# Patient Record
Sex: FEMALE | Race: WHITE
[De-identification: names, ages, dates, MRNs, and addresses within clinical notes are randomized per-mention and may not be internally consistent; named-entity substitution may affect disease eponyms.]

---

## 2018-02-19 ENCOUNTER — HOSPITAL ENCOUNTER (EMERGENCY)
Dept: HOSPITAL 25 - UCEAST | Age: 48
Discharge: HOME | End: 2018-02-19
Payer: MEDICARE

## 2018-02-19 VITALS — DIASTOLIC BLOOD PRESSURE: 92 MMHG | SYSTOLIC BLOOD PRESSURE: 143 MMHG

## 2018-02-19 DIAGNOSIS — Z88.8: ICD-10-CM

## 2018-02-19 DIAGNOSIS — I10: ICD-10-CM

## 2018-02-19 DIAGNOSIS — G43.909: Primary | ICD-10-CM

## 2018-02-19 PROCEDURE — 70450 CT HEAD/BRAIN W/O DYE: CPT

## 2018-02-19 PROCEDURE — 99212 OFFICE O/P EST SF 10 MIN: CPT

## 2018-02-19 PROCEDURE — 96360 HYDRATION IV INFUSION INIT: CPT

## 2018-02-19 PROCEDURE — G0463 HOSPITAL OUTPT CLINIC VISIT: HCPCS

## 2018-02-19 PROCEDURE — 96372 THER/PROPH/DIAG INJ SC/IM: CPT

## 2018-02-19 PROCEDURE — 96374 THER/PROPH/DIAG INJ IV PUSH: CPT

## 2018-02-19 NOTE — UC
Kimmy SORENSEN Gabriel, scribed for Celso Pisano MD on 02/19/18 at 1734 .





Headache HPI





- HPI Summary


HPI Summary: 





This patient is a 48 year [place] F [place] [facility] [place] 

with a chief complaint of a migraine since 0700 this morning. Recently she has 

been having more frequent and intense headaches. She has been having headaches 

every day for over a week. The patient rates the pain 10/10 in severity and 

radiating down her face into her cheeks. Symptoms alleviated by nothing. 

Patient reports vomiting, impaired speech, impaired vision, chills, photophobia

, and improper gait due to the pain. Patient denies weakness, numbness, CP, SOB

, ABD pain, and tingling. 





- History Of Current Complaint


Chief Complaint: UCHeadache


Stated Complaint: HEADACHE, AND VOMITING


Time Seen by Provider: 02/19/18 17:23


Hx Obtained From: Patient


Hx Last Menstrual Period: 1/30


Onset/Duration: Lasting Hours, Still Present


Onset Of Symptoms: Gradual


Initially Headache Was: Initial Pain Scale(0-10)= - 10


Currently Pain Is: Current Pain Scale(0-10)= - 10


Pain Intensity: 10


Pain Scale Used: 0-10 Numeric


Timing: Constant


Location of Headache: Frontal, Other: - down into face


Aggravating Factor(s): Bright Lights


Associated Signs And Symptoms: Positive: Negative - weakness, numbness, CP, SOB

, ABD pain, and tingling., Other (Noted In Comments)





- Allergies/Home Medications


Allergies/Adverse Reactions: 


 Allergies











Allergy/AdvReac Type Severity Reaction Status Date / Time


 


MS Bupropion Allergy  Rash Verified 01/08/14 11:54





[From Wellbutrin]     














PMH/Surg Hx/FS Hx/Imm Hx


Cardiovascular History: Hypertension


Neurological History: Migraine





- Surgical History


Surgical History: Yes


Surgery Procedure, Year, and Place: TUBES IN EARS (AS CHILD);.  LAPOROSCOPY;.  

HYSTERSCOPY;.  D&C;.  LASIK (CORRECTIVE EYE);





- Family History


Known Family History: Positive: Hypertension


   Negative: Diabetes, Renal Disease, Respiratory Disease, Seizure Disorder





- Social History


Occupation: Employed Part-time


Lives: With Family


Alcohol Use: Rare


Alcohol Amount: 1 glass of wine per week


Substance Use Type: None


Smoking Status (MU): Never Smoked Tobacco


Have You Smoked in the Last Year: No





Review of Systems


Constitutional: Chills


Eyes: Blurred Vision, Photophobia


Gastrointestinal: Vomiting


Neurological: Headache, Other - impaired speech, improper gait due to the pain


All Other Systems Reviewed And Are Negative: Yes





Physical Exam


Triage Information Reviewed: Yes


Vital Signs: 


 Initial Vital Signs











Temp  98.6 F   02/19/18 17:18


 


Pulse  72   02/19/18 17:18


 


Resp  14   02/19/18 17:18


 


BP  143/92   02/19/18 17:18


 


Pulse Ox  100   02/19/18 17:18











Vital Signs Reviewed: Yes





- Additional Comments





General: well-appearing, moderate pain distress


Skin: warm, color reflects adequate perfusion, dry


Head: normal


Eyes: EOMI, AUGUSTO, photophobic 


ENT: normal


Neck: supple, nontender


Respiratory: CTA, breath sounds present


Cardiovascular: RRR


Abdomen: soft, nontender


Bowel: present


Musculoskeletal: normal, strength/ROM intact


Neurological: normal, sensory/motor intact, A&O x3


Psychological: affect/mood appropriate








Diagnostics





- Radiology


  ** CT Brain 


Radiology Interpretation Completed By: Radiologist - NEGATIVE EXAMINATION. Dr. Pisano had reviewed this report.





Headache Course/Dx





- Course


Course Of Treatment: BP noted and advised to follow up with PCP. Allerigmarycarmen 

noted.  DISCUSSED WITH DR GARCIA; IF APHASIA IS NEW THEN, IMAGE BRAIN.  

DISCUSSED WITH PATIENT, SHE REPORTS THE STUTTERING WITH THESE TYPICAL MIGRAINES 

IS NEW OVER THE LAST FEW MIGRAINES.  IMPROVED IN CLINIC.  PATIENT DECLINES 

GOING TO THE EMERGENCY DEPARTMENT.  DISCUSSED RESULTS OF CT WITH PATIENT.  F/U 

PMD/NEUROLOGY.





- Differential Dx/Diagnosis


Provider Diagnoses: MIGRAINE HEADACHE.  HTN





- Physician Notifications


Discussed Patient Care With: Orlin Garcia


Time Discussed With Above Provider: 17:53


Instructed by Provider To: Other - Discussed patient care with Orlin Garcia 

and he stated that if it is a significant new aphasia you need to image but if 

shes had them before no imagining is needed.





Discharge





- Discharge Plan


Condition: Stable


Disposition: HOME


Prescriptions: 


HYDROcodone/ACETAMIN 5-325 MG* [Norco 5-325 TAB*] 1 tab PO Q4H PRN #10 tab MDD 6


 PRN Reason: Pain


Ondansetron ODT TAB* [Zofran 4 MG Odt TAB*] 4 mg PO Q6H PRN #10 tab.odt


 PRN Reason: Nausea


Patient Education Materials:  Migraine Headache (ED)


Referrals: 


Sherry TIAN,Gaby MANCILLA [Primary Care Provider] - 


Additional Instructions: 


FOLLOW UP WITH YOUR DOCTOR.


GO TO THE EMERGENCY DEPARTMENT FOR ANY WORSENING OF YOUR CONDITION OR QUESTIONS 

OR CONCERNS.


Your blood pressure was elevated during today's visit. Please follow up with 

your primary care provider in 1-2 weeks. 





The documentation as recorded by the Kimmy khan Gabriel accurately reflects 

the service I personally performed and the decisions made by me, Celso Pisano MD.

## 2018-02-19 NOTE — RAD
INDICATION: Headaches     



COMPARISON: MRI brain January 08, 2014



TECHNIQUE: Noncontrast axial source images were acquired from the skull base to the

vertex.



FINDINGS:



Ventricles/sulci: The ventricles and cisterns are normal in size and configuration for

age.



Brain parenchyma: There is no focal parenchymal finding, evidence of intracranial mass, 

or intracranial mass effect.



Intracranial hemorrhage:None.



Extra-axial spaces: There are no abnormal extra axial fluid collections or evidence of

extra-axial mass.



Calvarium: There is no calvarial fracture or other calvarial abnormality.



Scalp: There is no evidence of scalp or extracalvarial soft tissue abnormality.



Paranasal sinuses/mastoid: The paranasal sinuses and mastoid air cells are clear.



Other: None.



IMPRESSION: NEGATIVE EXAMINATION

## 2018-02-19 NOTE — XMS REPORT
Flora Figueroa

 Created on:February 15, 2018



Patient:Flora Figueroa

Sex:Female

:1970

External Reference #:[id number]





Demographics







 Address  [address]



   [place] [address]

 

 Mobile Phone  [phone]

 

 Email Address  [email or web address]

 

 Preferred Language  English

 

 Marital Status  Not  Or 

 

 Episcopal Affiliation  Unknown

 

 Race  White

 

 Ethnic Group  Not  Or 









Author







 Organization  [facility]

 

 Address  [address] [address]

 

 Phone  [phone]









Care Team Providers







 Name  Role  Phone

 

 Riaz Olson III, MD  Primary Care Physician  Unavailable









Payers







 Type  Date  Identification Numbers  Payment Provider  Subscriber

 

 Medicare Primary  Effective:  Policy Number:  Medicare  Flora Figueroa



   2013  [id number]    









 PayID: [id number]  [address]









 [place] [address]









 [facility]    Policy Number: [id number]  Medicaid  Flora Figueroa









 Group Name: 1 1  [address]

 

 PayID: [id number]  [place] [address]







Problems







 Date  Description  Provider  Status

 

 Onset: 2007  Seizure  Jose Manuel Tejada M.D.,FACP  Active

 

 Onset: 2007  Insomnia  Jose Manuel Tejada M.D.,FACP  Active

 

 Onset: 2007  Migraine with typical aura  Jose Manuel Tejada M.D.,FACP  
Active

 

 Onset: 2012  Chest pain  Tawnya Will D.O.  Active

 

 Onset: 2013  Myalgia &amp; Myositis Unspec  Riaz Olson M.D.  
Active

 

 Onset: 2013  Pure hypercholesterolemia  Riaz Olson M.D.  Active







Family History







 Date  Family Member(s)  Problem(s)  Comments

 

   General  Coronary Artery Disease (CAD)  M bro  50's MI,



       and other family



       members on maternal



       side

 

 :  (age  Father   due to MI  (+) smoker



 48 Years)      

 

   Father  Coronary Artery Disease (CAD)   50 yo MI

 

 Onset:  (age 19  Mother  Hypertension  



 Years)      

 

   Mother   due to MI  () - age



       57; no prior heart



       problems. (+)



       smoker, HTN

 

   Mother  Hypercholesterolemia  

 

   Mother  Coronary Artery Disease (CAD)   57 yo MI

 

   Paternal Grandmother  Hypertension  

 

   Maternal Grandfather   due to MI  () - age



       60's

 

   Maternal Grandmother   due to MI  () - age



       60's







Social History







 Type  Date  Description  Comments

 

 Marital Status    Single  

 

 Lives With    Daughter  

 

 Occupation    currently not working  

 

 Cigarette Use    Never Smoked Cigarettes  

 

 ETOH Use    Rarely consumes alcohol  

 

 Recreational Drug Use    Denies Drug Use  

 

 Smoking    Patient has never smoked  

 

 Daily Caffeine    Consumes on average 1 cup of  



     regular coffee per day  

 

 Enjoy Exercising    Does not enjoy exercising  

 

 Exercise Type/Frequency    Exercises regularly  occasionally walking

 

 General Hx Text      1 daughter







Allergies, Adverse Reactions, Alerts







 Date  Description  Reaction  Status  Severity  Comments

 

 2010  Wellbutrin XL  agitation, anxiety and  active    



     muscle cramps      

 

 2011  Lyrica  edema  active    

 

 2011  Gluten    active    

 

 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  

 

 2013  Neurontin  hyperactive  active    

 

 2013  Clonazepam  aggitated  active    

 

 10/17/2013  Tizanidine    active    







Medications







 Medication  Date  Status  Form  Strength  Qnty  SIG  Indications  Ordering



                 Provider

 

 Baclofen  02/15/  Active  Tablets  10mg  30tab  take   R51  Gus



   2018        s  tab every 8    Annabella, NP



             hours as    



             needed for    



             muscle    



             spasm    

 

 Naproxen  /  Active  Tablets  500mg  20tab  1 tablet  R51  Gus



   2018        s  with food    Annabella, NP



             by mouth    



             twice a day    



             as needed    

 

 Blood Pressure  /  Active  Misc    1unit  in the  401.9  Yenni



 Monitor          s  morning and    Hardeep Avila/Manual            at night    M.DSergey



 Inflate                

 

 Garlic  /  Active        daily    Unknown



   0000              

 

 Adderall XR  /  Active  Caps ER  30mg    1 by mouth    Unknown



   0000    24HR      every day    

 

 Klonopin  /  Active  Tablets  1mg    1po  a day    Unknown



   0000              

 

 Cholestyramine  /  Active  Packet  4gm    mix one    Sherry,



 Light  0000          packet in    nayeli Griffin and    MD



             take by    



             mouth twice    



             a day    

 

 Zinc  /  Active  Tablets  50mg    1 tab daily    Unknown



   0000          (otc)    

 

 B6 Natural  00/  Active  Tablets  50mg    take one    Unknown



   0000          capsule/tab    



             let daily    



             by mouth    

 

 D3-1000  0000/  Active  Capsules  5000Unit    please take    Unknown



   0000          1 tab daily    

 

 Zyflammend  /  Active        2 caps    Unknown



   0000          daily    

 

 Probiotic  /  Active  Capsules      1 by mouth    Unknown



   0000          every day    

 

                 

 

 Doxycycline  /  Hx  Capsules  100mg  42cap  si  A69.20  Gus



 Monohydrate  2018 -        s  twice a day    ROSAS Winchester



   /          x 21 days    



   2018              

 

 Cholestyramine  /  Hx  Packet  4gm  50uni  as directed    Yenni Tubbs        ts      Austin



                 MELENA



                 

 

 Propranolol HCL  /  Hx  Caps ER  80mg  30cap  1 tab po in  401.1  Yenni SANDERS   -    24HR    s  the evening    Austin,



                 MELENA



                 

 

 Lisinopril  /  Hx  Tablets  10mg  30tab  1 by mouth  401.9  Yenni gilliland  every day    Austin



                 MELENA



                 

 

 Aspir-81  /  Hx  Tablets  81mg  100ta  1 by mouth  401.9  Yenni Tubbs DR    bs  every day    Austin



                 MELENA



                 

 

 Atenolol  /  Hx  Tablets  25mg  90tab  1 by mouth  401.9  Yenni



    -        s  every day    Austin



                 MELENA



   2016              

 

 Propranolol HCL  /  Hx  Tablets  10mg    1 by mouth    Other



   2014 -          three times    Ordering



   /          a daily    Provider



                 

 

 Lamotrigine  /  Hx  Tablets  25mg  60tab  take 1 po    Gladys



    -        s  qhs x 2    Ben,



   /          weeks, then    M.D.



   2014          1 po bid    

 

 Cyclobenzaprine  10/17/  Hx  Tablets  10mg  30tab  one po tid  723.1  Beena



 HCL   -        s  prn spasm    Misbah,



   /              N.P.



                 

 

 Ibuprofen  /  Hx  Tablets  200mg  100ta  3 tablets    Beena



    -        bs  prn    Misbah,



   /              N.P.



   2013              

 

 Cyclobenzaprine  /  Hx  Tablets  5mg  20tab  1 tab po  729.1  Beena



 HCL   -        s  tid prn    Misbah,



   10/17/              N.P.



   2013              

 

 Venlafaxine HCL  /  Hx  Tablets  225mg  30tab  1 tab po qd    Beena



 ER  2013 -    ER 24HR    s      Misbah,



   /              N.P.



   2013              

 

 Tramadol HCL  /  Hx  Tablets  50mg  90tab  1 tab po q  729.1  Beena



    -        s  4-6 hours    Misbah,



   /          prn;    N.P.



                 

 

 Aleve  /  Hx  Capsules  220mg  60cap  prn    Other



    -        s      Ordering



   /              Provider



   2016              

 

 Adderall  /  Hx  Tablets  20mg  60tab  1 po bid    Other



    -        s      Ordering



   /              Provider



   2014              

 

 Gabapentin  02/15/  Hx  Capsules  300mg  90cap  300 mg po  729.1  Beena



    -        s  qd x1 day,    Misbah,



   /          then 300 mg    N.P.



             po bid x1    



             day, then    



             300 mg po    



             tid    

 

 Cyclobenzaprine  02/15/  Hx  Tablets  5mg  30tab  1 tab po  524.60  Beena



 HCL   -        s  qhs    Misbah,



   /              N.P.



   2013              

 

 Ergocalciferol  02/15/  Hx  Capsules  60502Rjpt  12cap  1 cap po q1  268.9  
Beena



    -        s  week for 2    Misbah,



   /          months then    N.P.



   2013          2x/mon    

 

 Butrans  /  Hx  Patches  10mcg/HR  4unit  apply 1    Riaz CHEN



    -    Weekly    s  patch    Whitney,



   /          weekly    M.DSergey



                 

 

 Prazosin HCL  /  Hx  Capsules  1mg    1 po tid    Riaz CHEN



    -              Whitney,



   /              M.D.



   2012              

 

 Ibuprofen  10/11/  Hx  Tablets  600mg  45tab  tid  382.9  Cholo



    -        s      Pachikara



   /              , MRIVER.



   2013              

 

 Amoxicillin  /  Hx  Capsules  500mg  30cap  1 tid for  462  Riaz CHEN



    -        s  10 days    Whitney,



   /              M.D.



   2011              

 

 Duragesic-25  /  Hx  Patches  25mcg/HR  10uni  apply    Jose Manuel



    -    72HR    ts  topically    HARRISON Tejada,



   10/13/          change q3    M.D.,FACP



             days    



               Use with    



             12mcg patch    

 

 Duragesic-12  /  Hx  Patches  12mcg/HR  10uni  topical q3d    Jose Manuel



    -    72HR    ts  with 25 mcg    HARRISON Tejada,



   10/11/          patch    M.D.,FACP



                 

 

 Duragesic-50  /  Hx  Patches  50mcg/HR  10uni  apply  729.1  oJse Manuel



    -    72HR    ts  topically    HARRISON Tejada,



   /          q3days    M.D.,FACP



   2010              

 

 Clonidine HCL  /  Hx  Tablets  0.1mg  60tab  1 po bid    Jose Manuel



    Arley Tejada,



   /              M.D.,FACP



   2011              

 

 Omeprazole  /  Hx  Capsules  20mg  30cap  1 po qd for  530.81  Jose Manuel gilliland  2 wks then    HARRISON Tejada,



   /          prn    M.D.,FACP



   2011              

 

 Savella Titration  /  Hx  Misc  12.5&amp;  1pak  as directed  729.1  Zohaib Chang  2010&amp;50    samples    HARRISON Tejada,



   /      mg        M.D.,FACP



   2010              

 

 Tramadol HCL  /  Hx  Tablets  50mg  100ta  PO qid prn  729.1  Jose Manuel



    Arley Tejada,



   /              M.D.,FACP



   2011              

 

 Duragesic-12  /  Hx  Patches  12mcg/HR  10uni  topical q3d    Jose Manuel



    -    72HR    ts  with 25 mcg    HARRISON Tejada,



   /          patch    M.D.,FACP



                 

 

 Adderall  /  Hx  Tablets  30mg  30tab  1 po daily    Jose Manuel



    Arley Tejada,



   /              M.D.,FACP



   2013              

 

 Vitamin D  /  Hx  Capsules  1000Unit  30cap  2 po qd  268.9  Jose Manuel



    Arley Tejada,



   /              M.D.,FACP



                 

 

 Duragesic-25  /  Hx  Patches  25mcg/HR  10uni  apply  729.1  Jose Manuel



    -    72HR    ts  topically    HARRISON Tejada,



   /          change q3    M.D.,FACP



   2010          days    

 

 Lyrica  /  Hx  Capsules  25mg  60cap  1 po bid    Jose Manuel



    Arley Tejada,



   /              M.D.,FACP



   2010              

 

 Methadone HCL  /  Hx  Tablets  5mg  120ta  1-2 po bid    Jose Manuel



    -        bs  prn pain    HARRISON Tejada,



   /              M.D.,FACP



   2010              

 

 Hydrocodone-Aceta  /  Hx  Tablets  10-325mg  40tab  1 q 4- 6  729.1  Felix delaney   -        s  hours prn    Keyla,



             pain    M.D.



                 

 

 Methadone HCL  /  Hx  Solution  5mg/5ML  600ml  5-10mg bid  729.1  Zohaib Easley



    -          prn pain    HARRISON Tejada,



                 M.D.,FACP



   2010              

 

 Savella  /  Hx  Tablets  100mg  60tab  1 bid  729.1  Jose Manuel



    -        s      HARRISON Tejada,



   .D.,FACP



   2010              

 

 Zofran  /  Hx  Tablets  4mg  60tab  1 q 6 hours  787.02  Jose Manuel



    -        s  prn nausea    HARRISON Tejada,



   .D.,FACP



   2010              

 

 Methadone HCL  /  Hx  Tablets  5mg  90tab  1 po tid    Jose Manuel Lopez -        s      HARRISON Tejada,



   .D.,FACP



                 

 

 Methadone HCL  /  Hx  Tablets  10mg  180ta  1or 2 tabs    Jose Manuel



   2009 -        bs  tid prn    HARRISON Tejada,



             pain    M.D.,FACP



   2010              

 

 Oxycodone HCL  /  Hx  Capsules  5mg  240ca  2tab q6hr  729.1  Jose Manuel



    -        ps  prn    HARRISON Tejada,



   .D.,FACP



                 

 

 Oxycontin  /  Hx  Tablets  20mg  60tab  1-2 q12h    Jose Manuel



    -    ER 12HR    darshana Tejada,



   .D.,FACP



                 

 

 Oxycodone HCL  /  Hx  Tablets  10mg  120ta  1 q 6 hours  729.1  Jose Manuel



    -        bs  prn pain    HARRISON Tejada,



                 M.D.,FACP



                 

 

 Cymbalta  /  Hx  Caps DR  30mg  60cap  po qd    Jose Manuel



    -    PA    s      HARRISON Tejada,



                 M.D.,FACP



   2010              

 

 Lamictal  /  Hx  Tablets  25mg    2 po qd    Jose Manuel Tejada,



   04/16/              M.D.,FACP



   2009              

 

 Methadone HCL  /  Hx  Tablets  10mg  120ta  1or 2 tabs  338.4  Jose Manuel



   2008 -        bs  po tid    D. Terrell,



   /              M.D.,FACP



   2009              

 

 Duragesic  10/01/  Hx  Patches  50mcg/HR  10uni  1 patch q72  729.1  Jose Manuel



   2008 -    72HR    ts      D. Terrell,



   /              M.D.,FACP



                 

 

 Duragesic  /  Hx  Patches  25mcg/HR  10uni  topical  729.1  Jose Manuel



   2008 -    72HR    ts  q3days    D. Terrell,



   /              M.D.,FACP



   2008              

 

 Ultram  /  Hx  Tablets  50mg  40tab  1 or 2 tabs    Riaz ESergey



   2008 -        s  q 6 hours    Whitney,



   /          prn pain    M.D.



   2013              

 

 Skelaxin  /  Hx  Tablets  800mg  90tab  1 q 8 hours    Jose Manuel



   2008 -        s  prn muscle    D. Terrell,



   10/22/          spasms    M.D.,FACP



                 

 

 Duragesic  07/15/  Hx  Patches  50mcg/HR  10uni  1 patch  729.1  Jose Manuel



   2008 -    72HR    ts  every 72    D. Terrell,



   /          hours    M.D.,FACP



                 

 

 Hydrocodone/Apap  /  Hx  Tablets  10-325mg  180ta  1 q 4 hours  729.1  
Jose Manuel



   2008 -        bs  prn pain    D. Terrell,



   /              M.D.,FACP



                 

 

 Cephalexin  /  Hx  Capsules  500mg  30cap  1 tid x 10  461.9  Jose Manuel



   2008 -        s  days    DSergey Tejada,



   07/15/              M.D.,FACP



   2008              

 

 Lyrica  /  Hx  Capsules  50mg  90cap  1 po tid  729.1  Jose Manuel



   2008 -        s      DSergey Tejada,



   10/22/              M.D.,FACP



   2008              

 

 Methadone  /  Hx  Tablets  10mg  12tab  1tab tid x    Jose Manuel



   2008 -        s  2days    DSergey Tejada,



   /              M.D.,FACP



   2008               1tab    



             bid x 2days    



                 



                 



             1tab qd x    



             2days    

 

 Lamictal  /  Hx  Tablets  25mg  3Mont  bid    Jose Manuel



   2007 -        h      HARRISON Tejada,



   /              M.D.,FACP



   2009              

 

 Amoxicillin  /  Hx  Capsules  500mg  40cap  2 bid for  461.9  Jose Manuel



    -        s  10 days    HARRISON Tejada,



   /              M.D.,FACP



   2008              

 

 Remeron  /  Hx  Tablets  30mg    1  Tabs PO    Jose Manuel



    -          Q hs    HARRISON Tejada,



   /              M.D.,FACP



   2008              

 

 Ambien  /  Hx  Tablets  10mg  30tab  1 po qhs    Jose Manuel



    -        s  prn sleep    HARRISON Tejada,



   /          insomnia    M.D.,FACP



                 

 

 Lamictal  /  Hx  Tablets  25mg    qd,    Jose Manuel



    -          increasing    HARRISON Tejada,



   /              M.D.,FACP



                 

 

 Ambien  /  Hx  Tablets  10mg.  10tab  1 hs prn    Jose Manuel



    -        s      HARRISON Tejada,



   /              M.D.,FACP



                 

 

 Zoloft  /  Hx  Tablets  50mg  30tab  1 PO qd    Unknown



   0000 -        s      



   2008              

 

 Lunesta  /00/  Hx            Unknown



   0000 -              



   2009              

 

 Cymbalta  /  Hx  Caps DR  60mg    1 PO qd    Unknown



   0000 -    Part          



   2009              

 

 Abilify  /  Hx  Tablets  5mg    1 po qd    Unknown



    -              



   2010              

 

 Wellbutrin XL  00/  Hx  Tablets  450  90tab  once qd  729.1  Unknown



   0000 -    ER 24HR    s      



   2010              

 

 Savella  00/  Hx  Tablets  50mg  60tab  1 po bid  729.1  Jose Manuel



    -        s      HARRISON Tejada,



   /              M.D.,FACP



                 

 

 Effexor  /00/  Hx  Tablets  37.5mg    3 po qd    Unknown



   0000 -              



   2011              

 

 Klonopin  00/  Hx  Tablets  2mg    1/2-1 tab    Unknown



   0000 -          up to qid    



   /          prn    



                 

 

 Effexor XR  /00/  Hx  Caps ER  150mg    1 tab po qd    Unknown



   0000 -    24HR      am    



   2013              

 

 Adderall  00/  Hx  Tablets  10mg    1 by mouth    Unknown



   0000 -          daily    



   2012              

 

 Valium  00/00/  Hx  Tablets  10mg  10tab  1 po qd    Unknown



   0000 -        s      



   2011              

 

 Multi Vitamin  00/00/  Hx  Liquid      1 po qd    Unknown



   0000 -              



   2012              

 

 Remeron  /00/  Hx  Tablets  15mg  90tab  1 po hs    Unknown



   0000 -        s      



   2011              

 

 Aspirin  00/00/  Hx  Tablets  81mg  50tab  1 po qd    Unknown



   0000 -    DR    s      



   2012              

 

 Doxepin HCL  /  Hx  Capsules  25mg    one po qday    Unknown



    -              



   2012              

 

 Butrans  00/  Hx  Patches  15mcg  4unit  topical    Unknown



   0000 -    Weekly    s  q7days    



   2012              

 

 Augmentin  //  Hx  Tablets  875mg  20tab  1 po bid    Unknown



   0000 -        s  for ten    



   10/13/          days    



   2011              

 

 Venlafaxine HCL  00/00/  Hx  Caps ER  150mg  30cap  1 po bid    Unknown



 ER  0000 -    24HR    s      



   2012              

 

 Tizanidine HCL  /  Hx  Tablets  4mg  60tab  take 1    Unknown



   0000 -        s  tablet po    



    and in    



             the am for    



             spasm.    

 

 Clonazepam  00/00/  Hx  Tablets  1mg  90tab  1 po q bid    Unknown



   0000 -        s      



   2013              

 

 Adderall XR  /  Hx  Caps ER  30mg  30cap  1 capsule    Unknown



   0000 -    24HR    s  po qday    



   2012              

 

 Aleve  /00/  Hx  Capsules  220mg  60cap  1 po bid    Unknown



   0000 -        s  prn    



   2012              

 

 Brainstorm  00/00/  Hx            Unknown



    -              



   2013              

 

 Pete Oil  /00/  Hx            Unknown



    -              



   2013              

 

 Elisabet Sagar Colin  00/00/  Hx            Unknown



    -              



   2013              

 

 Naprosyn  /00/  Hx  Tablets    60tab  1 po bid    Unknown



   0000 -        s      



   2013              

 

 Zanaflex  /00/  Hx  Capsules    270ca  po tid prn    Unknown



   0000 -        ps      



   2012              

 

 Aspirin DR  00/  Hx  Tablets  81mg    1 po qd    Unknown



   0000 -    DR          



   2013              

 

 Prazosin HCL  00/  Hx  Capsules  2mg    1-2 prn    Unknown



    -              



   2013              

 

 Vitamin D3 High  00/  Hx  Capsules  5000Unit    1 tabs    Unknown



 Potency  0000 -          daily    



   02/15/              



   2013              

 

 Melatonin  00/00/  Hx  Capsules  5mg  30cap  1 po qhs    Unknown



   0000 -        s      



   2013              

 

 Zyprexa  00/00/  Hx  Solution  2.5    sublingual    Unknown



   0000 -    Rec          



   2013              

 

 Temazepam  00/00/  Hx  Capsules    30cap  1 po qhs    Unknown



   0000 -        s      



   2013              

 

 Venlafaxine HCL  00/00/  Hx  Tablets  150mg  90tab  1 po qd    Unknown



 ER  0000 -    ER 24HR    s      



   2014              

 

 Clonazepam  /00/  Hx  Tablets  1mg  20tab  1-3 tablets    Unknown



   0000 -        s   po prn    



   2013              

 

 Ibuprofen  /00/  Hx  Tablets  200mg  100ta  3 tabs prn    Unknown



   0000 -        bs      



   2016              

 

 Trazodone HCL  00/  Hx  Tablets  50mg  30tab  1 tablet at    Unknown



   0000 -        s  bedtime as    



   2014              

 

 Stinging Nettle  /  Hx  Capsule      4 PO qd    Unknown



   0000 -              



   2014              

 

 Zyflammend  /  Hx        2 PO qd    Unknown



   0000 -              



   01/10/              



   2014              

 

 Tumeric  00/  Hx        4 PO qd    Unknown



   0000 -              



   2014              

 

 Omega 3  //  Hx  Capsules  1000mg  100ca  1 po qd.    Unknown



   0000 -        ps      



   2014              

 

 Magnesium  /00/  Hx        daily    Unknown



   0000 -              



   2016              

 

 Effexor XR  /  Hx  Caps ER  75mg    tapering    Unknown



   0000 -    24HR      dose    



   2016              







Immunizations







 CPT Code  Status  Date  Vaccine  Lot #

 

 82426  Given  2007  Influenza Virus 3Yrs &amp; Over  [id number]

 

 88743  Given  Unknown  Tetanus And Diptheria (Td) For Adult Use  



       Preservative Free  







Vital Signs







 Date  Vital  Result  Comment

 

 02/15/2018  Weight  150.75 lb  









 Heart Rate  77 /min  

 

 BP Systolic  126 mmHg  

 

 BP Diastolic  76 mmHg  

 

 Body Temperature  97.0 F  

 

 O2 % BldC Oximetry  98 %  









 2018  Height  67 inches  5'7"









 Weight  152.00 lb  

 

 Heart Rate  74 /min  

 

 BP Systolic  123 mmHg  

 

 BP Diastolic  82 mmHg  

 

 Body Temperature  98.1 F  

 

 O2 % BldC Oximetry  98 %  

 

 BMI (Body Mass Index)  23.8 kg/m2  









 2018  Height  66 inches  5'6"









 Weight  152.00 lb  

 

 Heart Rate  92 /min  

 

 BP Systolic Sitting  124 mmHg  

 

 BP Diastolic Sitting  78 mmHg  

 

 Respiratory Rate  14 /min  

 

 Body Temperature  98.2 F  

 

 BMI (Body Mass Index)  24.5 kg/m2  









 2018  Weight  150.25 lb  









 Heart Rate  100 /min  

 

 BP Systolic  140 mmHg  

 

 BP Diastolic  86 mmHg  

 

 Body Temperature  98.3 F  

 

 O2 % BldC Oximetry  99 %  









 2016  Weight  175.00 lb  









 Heart Rate  82 /min  

 

 BP Systolic Sitting  145 mmHg  

 

 BP Diastolic Sitting  101 mmHg  

 

 Body Temperature  98.7 F  









 2014  Height  66.5 inches  5'6.50"









 Weight  196.00 lb  

 

 Heart Rate  83 /min  

 

 BP Systolic Sitting  142 mmHg  

 

 BP Diastolic Sitting  86 mmHg  

 

 O2 % BldC Oximetry  98 %  

 

 BMI (Body Mass Index)  31.2 kg/m2  









 2014  Height  66.5 inches  5'6.50"









 Weight  193.00 lb  

 

 BP Systolic  130 mmHg  Ra large cuff

 

 BP Diastolic  86 mmHg  Ra large cuff

 

 BP Systolic Sitting  112 mmHg  LA large cuff

 

 BP Diastolic Sitting  82 mmHg  LA large cuff

 

 BP Systolic Standing  118 mmHg  LA

 

 BP Diastolic Standing  88 mmHg  LA

 

 Respiratory Rate  16 /min  

 

 BMI (Body Mass Index)  30.7 kg/m2  









 2014  Weight  193.75 lb  









 Heart Rate  86 /min  

 

 BP Systolic Sitting  141 mmHg  

 

 BP Diastolic Sitting  97 mmHg  









 01/10/2014  Heart Rate  76 /min  









 BP Systolic Sitting  130 mmHg  

 

 BP Diastolic Sitting  70 mmHg  

 

 Respiratory Rate  16 /min  









 2013  Heart Rate  80 /min  









 BP Systolic Sitting  150 mmHg  

 

 BP Diastolic Sitting  90 mmHg  

 

 Respiratory Rate  17 /min  









 10/17/2013  Weight  189.75 lb  









 Heart Rate  98 /min  

 

 BP Systolic Sitting  132 mmHg  

 

 BP Diastolic Sitting  90 mmHg  









 2013  Height  66.5 inches  5'6.50"









 Weight  193.50 lb  

 

 Heart Rate  88 /min  

 

 BP Systolic Sitting  124 mmHg  

 

 BP Diastolic Sitting  88 mmHg  

 

 BMI (Body Mass Index)  30.8 kg/m2  









 2013  Height  66.75 inches  5'6.75"









 Weight  193.50 lb  

 

 Heart Rate  96 /min  

 

 BP Systolic Sitting  136 mmHg  

 

 BP Diastolic Sitting  106 mmHg  

 

 BMI (Body Mass Index)  30.5 kg/m2  









 2013  Weight  191.00 lb  









 Heart Rate  105 /min  

 

 BP Systolic Sitting  125 mmHg  

 

 BP Diastolic Sitting  90 mmHg  









 2013  Height  66.5 inches  5'6.50"









 Weight  193.75 lb  

 

 Heart Rate  96 /min  

 

 BP Systolic Sitting  130 mmHg  

 

 BP Diastolic Sitting  90 mmHg  

 

 BMI (Body Mass Index)  30.8 kg/m2  









 02/15/2013  Height  66.5 inches  5'6.50"









 Weight  184.00 lb  

 

 Heart Rate  72 /min  

 

 BP Systolic Sitting  122 mmHg  

 

 BP Diastolic Sitting  70 mmHg  

 

 BMI (Body Mass Index)  29.3 kg/m2  









 2013  Height  66.5 inches  5'6.50"









 Weight  189.75 lb  

 

 Heart Rate  84 /min  

 

 BP Systolic Sitting  140 mmHg  

 

 BP Diastolic Sitting  88 mmHg  

 

 BMI (Body Mass Index)  30.2 kg/m2  









 2013  Height  66.5 inches  5'6.50"









 Weight  190.00 lb  

 

 Heart Rate  88 /min  

 

 BP Systolic Sitting  154 mmHg  

 

 BP Diastolic Sitting  102 mmHg  

 

 BMI (Body Mass Index)  30.2 kg/m2  









 2012  Height  66.50 inches  5'6.50"









 Weight  177.00 lb  

 

 Heart Rate  93 /min  

 

 BP Systolic Sitting  150 mmHg  

 

 BP Diastolic Sitting  100 mmHg  

 

 BMI (Body Mass Index)  28.1 kg/m2  









 2012  Height  66.50 inches  5'6.50"









 Weight  176.00 lb  

 

 Heart Rate  76 /min  

 

 BP Systolic Sitting  124 mmHg  

 

 BP Diastolic Sitting  88 mmHg  

 

 BMI (Body Mass Index)  28.0 kg/m2  









 10/11/2011  Height  66.50 inches  5'6.50"









 Weight  166.00 lb  

 

 Heart Rate  68 /min  

 

 BP Systolic Sitting  150 mmHg  

 

 BP Diastolic Sitting  90 mmHg  

 

 BMI (Body Mass Index)  26.4 kg/m2  









 2011  Heart Rate  80 /min  









 BP Systolic  124 mmHg  

 

 BP Diastolic  90 mmHg  









 2011  Weight  169.00 lb  









 Heart Rate  62 /min  

 

 BP Systolic Sitting  112 mmHg  

 

 BP Diastolic Sitting  70 mmHg  

 

 Body Temperature  101.5 F  lt ear









 2011  Height  66 inches  5'6"









 Weight  167.00 lb  

 

 Heart Rate  86 /min  

 

 BP Systolic  120 mmHg  

 

 BP Diastolic  70 mmHg  

 

 BP Systolic Sitting  122 mmHg  

 

 BP Diastolic Sitting  80 mmHg  

 

 BP Systolic Standing  120 mmHg  

 

 BP Diastolic Standing  80 mmHg  

 

 Respiratory Rate  16 /min  

 

 BMI (Body Mass Index)  27.0 kg/m2  









 2011  Height  66.25 inches  5'6.25"









 Weight  167.00 lb  

 

 Heart Rate  84 /min  

 

 BP Systolic Sitting  130 mmHg  

 

 BP Diastolic Sitting  86 mmHg  

 

 BMI (Body Mass Index)  26.7 kg/m2  









 07/15/2010  Height  66.25 inches  5'6.25"









 Weight  163.00 lb  

 

 Heart Rate  88 /min  

 

 BP Systolic Sitting  142 mmHg  

 

 BP Diastolic Sitting  94 mmHg  

 

 BMI (Body Mass Index)  26.1 kg/m2  









 2010  Height  66.25 inches  5'6.25"









 Weight  161.00 lb  

 

 Heart Rate  92 /min  

 

 BP Systolic Sitting  130 mmHg  

 

 BP Diastolic Sitting  80 mmHg  

 

 BMI (Body Mass Index)  25.8 kg/m2  









 2010  Height  66.25 inches  5'6.25"









 Weight  161.75 lb  

 

 Heart Rate  76 /min  

 

 BP Systolic  118 mmHg  

 

 BP Diastolic  92 mmHg  

 

 Respiratory Rate  16 /min  

 

 Body Temperature  98.7 F  

 

 BMI (Body Mass Index)  25.9 kg/m2  









 2010  Height  66.25 inches  5'6.25"









 Weight  156.00 lb  

 

 Heart Rate  80 /min  

 

 BP Systolic  114 mmHg  

 

 BP Diastolic  84 mmHg  

 

 BMI (Body Mass Index)  25.0 kg/m2  









 2010  Height  66.25 inches  5'6.25"









 Weight  158.00 lb  

 

 Heart Rate  60 /min  

 

 BP Systolic Sitting  120 mmHg  

 

 BP Diastolic Sitting  70 mmHg  

 

 BMI (Body Mass Index)  25.3 kg/m2  









 2010  Height  66.50 inches  5'6.50"









 Weight  156.50 lb  

 

 Heart Rate  84 /min  

 

 BP Systolic Sitting  124 mmHg  

 

 BP Diastolic Sitting  92 mmHg  

 

 BMI (Body Mass Index)  24.9 kg/m2  









 2010  Height  66.50 inches  5'6.50"









 Weight  160.50 lb  

 

 Heart Rate  96 /min  

 

 BP Systolic Sitting  130 mmHg  

 

 BP Diastolic Sitting  82 mmHg  

 

 BMI (Body Mass Index)  25.5 kg/m2  









 2009  Height  66.50 inches  5'6.50"









 Weight  161.00 lb  

 

 Heart Rate  84 /min  

 

 BP Systolic Sitting  116 mmHg  

 

 BP Diastolic Sitting  84 mmHg  

 

 BMI (Body Mass Index)  25.6 kg/m2  









 2009  Height  66.50 inches  5'6.50"









 Weight  162.00 lb  

 

 Heart Rate  84 /min  

 

 BP Systolic Sitting  134 mmHg  

 

 BP Diastolic Sitting  92 mmHg  

 

 BMI (Body Mass Index)  25.8 kg/m2  









 2009  Height  66.50 inches  5'6.50"









 Weight  161.00 lb  

 

 Heart Rate  88 /min  

 

 BP Systolic Sitting  112 mmHg  

 

 BP Diastolic Sitting  80 mmHg  

 

 BMI (Body Mass Index)  25.6 kg/m2  









 2009  Height  66.50 inches  5'6.50"









 Weight  167.00 lb  

 

 Heart Rate  76 /min  

 

 BP Systolic Sitting  122 mmHg  

 

 BP Diastolic Sitting  86 mmHg  

 

 BMI (Body Mass Index)  26.5 kg/m2  









 2009  Weight  161.00 lb  









 Heart Rate  90 /min  

 

 BP Systolic Sitting  112 mmHg  

 

 BP Diastolic Sitting  62 mmHg  









 2009  Height  67 inches  5'7"









 Weight  156.00 lb  

 

 Heart Rate  64 /min  

 

 BP Systolic Sitting  118 mmHg  

 

 BP Diastolic Sitting  76 mmHg  

 

 BMI (Body Mass Index)  24.4 kg/m2  









 2009  Weight  151.00 lb  









 Heart Rate  70 /min  

 

 BP Systolic Sitting  130 mmHg  

 

 BP Diastolic Sitting  70 mmHg  

 

 Respiratory Rate  16 /min  









 2008  Height  67 inches  5'7"









 Weight  142.00 lb  

 

 Heart Rate  86 /min  

 

 BP Systolic Sitting  124 mmHg  

 

 BP Diastolic Sitting  82 mmHg  

 

 BMI (Body Mass Index)  22.2 kg/m2  









 10/22/2008  Height  67 inches  5'7"









 Weight  140.00 lb  

 

 Heart Rate  60 /min  

 

 BP Systolic Sitting  116 mmHg  

 

 BP Diastolic Sitting  60 mmHg  

 

 BMI (Body Mass Index)  21.9 kg/m2  









 2008  Height  67 inches  5'7"









 Weight  154.00 lb  

 

 Heart Rate  68 /min  

 

 BP Systolic Sitting  124 mmHg  

 

 BP Diastolic Sitting  82 mmHg  

 

 BMI (Body Mass Index)  24.1 kg/m2  









 07/15/2008  Height  67 inches  5'7"









 Weight  155.00 lb  

 

 Heart Rate  76 /min  

 

 BP Systolic Sitting  114 mmHg  

 

 BP Diastolic Sitting  76 mmHg  

 

 BMI (Body Mass Index)  24.3 kg/m2  









 2008  Height  67 inches  5'7"









 Weight  157.00 lb  

 

 Heart Rate  62 /min  

 

 BP Systolic Sitting  116 mmHg  

 

 BP Diastolic Sitting  60 mmHg  

 

 BMI (Body Mass Index)  24.6 kg/m2  









 2008  Height  67 inches  5'7"









 Weight  163.00 lb  

 

 Heart Rate  76 /min  

 

 BP Systolic Sitting  112 mmHg  

 

 BP Diastolic Sitting  62 mmHg  

 

 BMI (Body Mass Index)  25.5 kg/m2  









 2007  Height  67 inches  5'7"









 Weight  167.00 lb  

 

 Heart Rate  80 /min  

 

 BP Systolic Sitting  112 mmHg  

 

 BP Diastolic Sitting  80 mmHg  

 

 Body Temperature  98.3 F  

 

 BMI (Body Mass Index)  26.2 kg/m2  









 2007  Height  67 inches  5'7"









 Weight  150.00 lb  

 

 Heart Rate  88 /min  

 

 BP Systolic Sitting  115 mmHg  

 

 BP Diastolic Sitting  70 mmHg  

 

 BMI (Body Mass Index)  23.5 kg/m2  







Results







 Test  Date  Test  Result  H/L  Range  Note

 

 Laboratory test finding  2016  Monospot  Negative    Negative  1

 

 CBC Auto Diff  2016  White Blood Count  4.8 10^3/uL    3.5-10.8  









 Red Blood Count  4.77 10^6/uL    4.0-5.4  

 

 Hemoglobin  13.3 g/dL    12.0-16.0  

 

 Hematocrit  40 %    35-47  

 

 Mean Corpuscular Volume  83 fL    80-97  

 

 Mean Corpuscular Hemoglobin  28 pg    27-31  

 

 Mean Corpuscular HGB Conc  34 g/dL    31-36  

 

 Red Cell Distribution Width  13 %    10.5-15  

 

 Platelet Count  292 10^3/uL    150-450  

 

 Mean Platelet Volume  9 um3    7.4-10.4  

 

 Abs Neutrophils  2.5 10^3/uL    1.5-7.7  

 

 Abs Lymphocytes  1.8 10^3/uL    1.0-4.8  

 

 Abs Monocytes  0.4 10^3/uL    0-0.8  

 

 Abs Eosinophils  0 10^3/uL    0-0.6  

 

 Abs Basophils  0 10^3/uL    0-0.2  

 

 Abs Nucleated RBC  0 10^3/uL      

 

 Granulocyte %  53.3 %    38-83  

 

 Lymphocyte %  36.8 %    25-47  

 

 Monocyte %  8.6 %    1-9  

 

 Eosinophil %  0.4 %    0-6  

 

 Basophil %  0.9 %    0-2  

 

 Nucleated Red Blood Cells %  0.1      









 Laboratory test finding  2016  Erythrocyte Sed Rate  14 mm/Hr    0-14  2









 CRP High Sensitivity  1.99 mg/L      3









 Comp Metabolic Panel  2016  Sodium  135 mmol/L    133-145  









 Potassium  4.2 mmol/L    3.5-5.0  

 

 Chloride  100 mmol/L  Low  101-111  

 

 Co2 Carbon Dioxide  28 mmol/L    22-32  

 

 Anion Gap  7 mmol/L    2-11  

 

 Glucose  91 mg/dL      

 

 Blood Urea Nitrogen  8 mg/dL    6-24  

 

 Creatinine  0.79 mg/dL    0.51-0.95  

 

 BUN/Creatinine Ratio  10.1    8-20  

 

 Calcium  9.3 mg/dL    8.6-10.3  

 

 Total Protein  7.3 g/dL    6.4-8.9  

 

 Albumin  4.5 g/dL    3.2-5.2  

 

 Globulin  2.8 g/dL    2-4  

 

 Albumin/Globulin Ratio  1.6    1-3  

 

 Total Bilirubin  0.40 mg/dL    0.2-1.0  

 

 Alkaline Phosphatase  53 U/L      

 

 Alt  12 U/L    7-52  

 

 Ast  16 U/L    13-39  

 

 Egfr Non-  78.3    &gt;60  

 

 Egfr   100.8    &gt;60  4









 Laboratory test finding  2016  TSH (Thyroid Stim Horm)  2.42 ?IU/mL    
0.34-5.60  









 Lyme Disease Serology  Negative    Negative  5









 Urinalysis Profile  2016  Urine White Blood Cell  Trace(0-5/hpf)    
Absent  









 Urine Red Blood Cell  Trace(0-2/hpf)    Absent  

 

 Urine Bacteria  Absent    Absent  

 

 Urine Squamous Epithelial Cell  Present    Absent  

 

 Urine Color  Yellow      

 

 Urine Appearance  Clear      

 

 Urine Specific Gravity  1.006  Low  1.010-1.030  

 

 Urine pH  7.0    5-9  

 

 Urine Urobilinogen  Negative    Negative  

 

 Urine Ketones  Negative    Negative  

 

 Urine Protein  Negative    Negative  

 

 Urine Leukocytes  Negative    Negative  

 

 Urine Blood  2+    Negative  

 

 Urine Nitrite  Negative    Negative  

 

 Urine Bilirubin  Negative    Negative  

 

 Urine Glucose  Negative    Negative  









 Vitamin D, 25 Hydroxy  10/17/2013  25-Hydroxy Vitamin D2  8.8 ng/mL      









 25-Hydroxy Vitamin D3  21 ng/mL      

 

 25-Hydroxy Vitamin D Total  30 ng/mL      6









 Vitamin B12 And Folate Serum  10/17/2013  Vitamin B12  527 pg/mL    180-914  









 Folate  9.1 ng/mL    2-16  









 CBC With Manual Diff  10/17/2013  White Blood Count  7.2 10^3/uL    4.8-10.8  









 Red Blood Count  4.60 10^6/uL    4.0-5.4  

 

 Hemoglobin  13.5 g/dL    12.0-16.0  

 

 Hematocrit  39 %    35-47  

 

 Mean Corpuscular Volume  85 fL    80-97  

 

 Mean Corpuscular Hemoglobin  29 pg    27-31  

 

 Mean Corpuscular HGB Conc  34 g/dL    31-36  

 

 Red Cell Distribution Width  14 %    10.5-15  

 

 Platelet Count  294 10^3/uL    150-450  

 

 Mean Platelet Volume  9 um3    7.4-10.4  

 

 Abs Neutrophils  4.7 10^3/uL    1.5-7.7  

 

 Abs Lymphocytes  2.0 10^3/uL    1.0-4.8  

 

 Abs Monocytes  0.5 10^3/uL    0-0.8  

 

 Abs Eosinophils  0 10^3/uL    0-0.6  

 

 Abs Basophils  0 10^3/uL    0-0.2  

 

 Abs Nucleated RBC  0 10^3/uL      

 

 Neutrophil %  56 %    38-83  

 

 Band %  2 %    0-8  

 

 Lymphocytes %  30 %    25-47  

 

 Monocytes %  9 %    0-13  

 

 Eosinophils %  3 %    0-6  

 

 RBC Morphology  Normal    Normal  









 Basic Metabolic Panel  10/17/2013  Sodium  134 mmol/L    133-145  









 Potassium  4.5 mmol/L    3.5-5.0  

 

 Chloride  103 mmol/L    101-111  

 

 Co2 Carbon Dioxide  26.0 mmol/L    22-32  

 

 Anion Gap  5.0 mmol/L    2-11  

 

 Glucose  108 mg/dL  High    

 

 Blood Urea Nitrogen  11 mg/dL    6-24  

 

 Creatinine  0.80 mg/dL    0.50-1.40  

 

 BUN/Creatinine Ratio  13.8    8-20  

 

 Calcium  8.9 mg/dL    8.1-9.9  

 

 Egfr Non-  78.3    &gt;60  

 

 Egfr   100.7    &gt;60  7









 Liver Function Panel  2013  Total Protein  6.4 g/dL    6.2-8.1  









 Albumin  3.8 g/dL    3.6-5.4  

 

 Globulin  2.6 g/dL    2-4  

 

 Albumin/Globulin Ratio  1.5    1-3  

 

 Total Bilirubin  0.5 mg/dL    0.4-1.5  

 

 Direct Bilirubin  0.1 mg/dL    0.1-0.5  

 

 Indirect Bilirubin  0.4 mg/dL    0.3-1.0  

 

 Alkaline Phosphatase  73 U/L      

 

 Alt  18 U/L    14-54  

 

 Ast  19 U/L    12-42  









 Laboratory test finding  2013  Egg White Allergen IgE  &lt;0.35 kU/L    
  8









 Beef Allergen IgE  &lt;0.35 kU/L      9

 

 Chicken Meat Allergen IgE  &lt;0.35 kU/L      10

 

 Chocolate Allergen IgE  &lt;0.35 kU/L      11

 

 Corn Allergen IgE  &lt;0.35 kU/L      12

 

 Cow's Milk Allergen IgE  &lt;0.35 kU/L      13

 

 Orange Allergen IgE  &lt;0.35 kU/L      14

 

 Peanut Allergen IgE  &lt;0.35 kU/L      15

 

 Rice Allergen IgE  &lt;0.35 kU/L      16

 

 Soybean Allergen IgE  &lt;0.35 kU/L      17

 

 Tomato Allergen IgE  &lt;0.35 kU/L      18

 

 Wheat Allergen IgE  &lt;0.35 kU/L      19









 Lipid Profile (Trig/Chol/HDL)  2013  Triglycerides  90 mg/dL      









 Cholesterol  180 mg/dL    Less than 200  

 

 HDL Cholesterol  52 mg/dL    40-60  20

 

 Cholesterol/HDL Ratio  3.5 Average    1-4.44  

 

 LDL Cholesterol  110.0  High  Less Than 100  21









 Laboratory test finding  2013  Glucose  99 mg/dL      22

 

 Laboratory test finding  2013  Free T4  0.76 ng/mL    0.61-1.24  









 T4  5.6 g/mL    5.0-12.0  

 

 Total T3  0.62 ng/mL    0.5-1.7  









 Laboratory test finding  2013  TSH (Thyroid  1.61 miu/mL    0.34-5.60  



     Stimulating Horm)        

 

 Vitamin D, 25 Hydroxy  2013  25-Hydroxy Vitamin D2  &lt;4.0 ng/mL      









 25-Hydroxy Vitamin D3  28 ng/mL      

 

 25-Hydroxy Vitamin D Total  28 ng/mL      23









 Laboratory test finding  2013  C Reactive Protein  0.6 mg/dL  High  Less 
than 0.5  









 Erythrocyte Sed Rate  17 mm/Hr  High  0-14  









 Comp Metabolic Panel  2013  Sodium  133 mmol/L    133-145  









 Potassium  4.5 mmol/L    3.5-5.0  

 

 Chloride  103 mmol/L    101-111  

 

 Co2 Carbon Dioxide  26.0 mmol/L    22-32  

 

 Anion Gap  4.0 mmol/L    2-11  

 

 Glucose  105 mg/dL  High    

 

 Blood Urea Nitrogen  11 mg/dL    6-24  

 

 Creatinine  0.70 mg/dL    0.50-1.40  

 

 BUN/Creatinine Ratio  15.7    8-20  

 

 Calcium  9.5 mg/dL    8.1-9.9  

 

 Total Protein  6.0 g/dL  Low  6.2-8.1  

 

 Albumin  3.8 g/dL    3.6-5.4  

 

 Globulin  2.2 g/dL    2-4  

 

 Albumin/Globulin Ratio  1.7    1-3  

 

 Total Bilirubin  0.4 mg/dL    0.4-1.5  

 

 Alkaline Phosphatase  66 U/L      

 

 Alt  16 U/L    14-54  

 

 Ast  20 U/L    12-42  

 

 Egfr Non-  91.8    &gt;60  

 

 Egfr   118.0    &gt;60  24









 CBC Auto Diff  2013  White Blood Count  5.5 10^3/uL    4.8-10.8  









 Red Blood Count  4.28 10^6/uL    4.0-5.4  

 

 Hemoglobin  12.2 g/dL    12.0-16.0  

 

 Hematocrit  35 %    35-47  

 

 Mean Corpuscular Volume  82 fL    80-97  

 

 Mean Corpuscular Hemoglobin  29 pg    27-31  

 

 Mean Corpuscular HGB Conc  35 g/dL    31-36  

 

 Red Cell Distribution Width  13 %    10.5-15  

 

 Platelet Count  259 10^3/uL    150-450  

 

 Mean Platelet Volume  9 um3    7.4-10.4  

 

 Abs Neutrophils  3.1 10^3/uL    1.5-7.7  

 

 Abs Lymphocytes  1.8 10^3/uL    1.0-4.8  

 

 Abs Monocytes  0.5 10^3/uL    0-0.8  

 

 Abs Eosinophils  0 10^3/uL    0-0.6  

 

 Abs Basophils  0 10^3/uL    0-0.2  

 

 Abs Nucleated RBC  0 10^3/uL      

 

 Granulocyte %  56.7 %    38-83  

 

 Lymphocyte %  32.7 %    25-47  

 

 Monocyte %  9.9 %  High  1-9  

 

 Eosinophil %  0.2 %    0-6  

 

 Basophil %  0.5 %    0-2  

 

 Nucleated Red Blood Cells %  0      









 Vitamin D, 25 Hydroxy  2012  25-Hydroxy Vitamin D2  &lt;4.0 ng/mL    ()  









 25-Hydroxy Vitamin D3  18 ng/mL    ()  

 

 25-Hydroxy Vitamin D Total  18 ng/mL    ()  25









 Lead  2012  Lead  &lt; 1.0 g/dL    0-25.0  26









 Lead Specimen Type  VENOUS      









 Laboratory test finding  2012  [place]erc[place],Whole Blood  &lt;1 ng/mL    0-9  
27

 

 DR Olson's Lab Panel  2011  TSH  1.64 MIU/ML    0.34-5.60  

 

 Comp Metabolic Panel  2011  Sodium  138 mmol/L    135-145  









 Potassium  4.0 mmol/L    3.5-5.0  

 

 Chloride  105 mmol/L    101-111  

 

 Co2 (Carbon Dioxide)  26.0 mmol/L    22-32  

 

 Anion Gap  7.0 mmol/L    2-11  28

 

 Glucose  73 mg/dL      

 

 BUN  10 mg/dL    6-24  

 

 Creatinine  0.7 mg/dL    0.50-1.40  

 

 One Over Creatinine  1.42      

 

 BUN/Creatinine Ratio  14.3    8-20  

 

 Calcium  9.4 mg/dL    8.1-9.9  

 

 Total Protein  6.4 GM/DL    6.2-8.1  

 

 Albumin  4.2 GM/DL    3.6-5.4  

 

 Globulin  2.2 GM/DL    2-4  

 

 Albumin/Globulin Ratio  1.9    1-3  

 

 Bilirubin Total  0.4 mg/dL    0.4-1.5  29

 

 Alkaline Phosphatase  79 U/L      

 

 Alt (SGPT)  17 U/L    14-54  

 

 Ast (Sgot)  23 U/L    12-42  

 

 eGFR Non-  92.2    &gt; 60  

 

 eGFR   118.6    &gt; 60  30









 Lipid Profile (Trig/Chol/HDL)  2011  Triglyceride  106 mg/dL      









 Cholesterol  202 mg/dL  High  Less Than 200  31

 

 High Density Lipoprotein  55 mg/dL    40-60  32

 

 Cholesterol/HDL Ratio  3.67 AVERAGE    1-4.44  

 

 Low Density Lipoprotein  126 mg/dL  High  Less Than 100  33









 Laboratory test finding  2011  Erythrocyte Sed Rate  8 MM/HR    0-15  









 C Reactive Protein  &lt; 0.5 mg/dL    Less Than 0.5  

 

 Rheumatoid Factor  &lt; 15 IU/mL    &lt;15  34

 

 Lyme Disease Serology  Negative    Negative  35









 Trish (Antinuclear Antibodies)  2011  Antinuclear AB  NEGATIVE    Negative
  

 

 CBC Auto Diff  2011  White Blood Count  6.5 CUMM    4.8-10.8  









 Red Cell Count  4.24 CUMM    4.2-5.4  

 

 Hemoglobin  12.0 g/dL    12.0-16.0  

 

 Hematocrit  35 %    35-47  

 

 Mean Corpuscular Volume  84 um3    79-97  

 

 Mean Corpuscular Hemoglob  28 pg    27-31  

 

 Mean Corpuscular HGB Cone  34 g/dL    32-36  

 

 Redcell Distribution WDTH  13 %    10.5-15  

 

 Platelet Count  290 CUMM    150-450  

 

 Mean Platelet Volume  8.4 um3    7.4-10.4  

 

 Gran %  61.8 %    38-83  

 

 Lymph %  29.2 %    25-47  

 

 Mononuclear %  8.4 %    1-9  

 

 Eosinophil %  0 %    0-6  

 

 Basophil %  0.6 %    0-2  

 

 Abs Lymphs  1.9    1.0-4.8  

 

 Abs Mononuclear  0.5    0-0.8  

 

 Absolute Neutrophil Count  4.0    1.5-7.7  

 

 Abs Eosinophils  0    0-0.6  

 

 Abs Basophils  0    0-0.2  









 Vad  10/11/2011  Vad Final  NONREACTIVE    Nonreactive  36

 

 Vitamin D, 25 Hydroxy  2010  25-Hydroxy Vitamin D2  &lt;4.0 ng/mL    ()  









 25-Hydroxy Vitamin D3  26 ng/mL    ()  

 

 25-Hydroxy Vitamin D Total  26 ng/mL    ()  37









 Laboratory test finding  2010  Vitamin D, 1,25 Dihydroxy  29 pg/mL    18-
78  38

 

 Laboratory test finding  2010  Cortisol  14.3 g/dL      39









 Dhea Sulfate  62.1 g/dL      40

 

 Dhea  2.6 ng/mL    &lt;10  41

 

 Vitamin B12  703 pg/mL    180-914  

 

 Glucose  105 mg/dL  High    42









 Vitamin D, 25 Hydroxy  2010  25-Hydroxy Vitamin D2  &lt;4.0 ng/mL    ()  









 25-Hydroxy Vitamin D3  27 ng/mL    ()  

 

 25-Hydroxy Vitamin D Total  27 ng/mL    ()  43









 Laboratory test finding  2009  Erythrocyte Sed Rate  10 MM/HR    0-15  









 Lyme Disease Serology  Negative    Negative  44

 

 Rheumatoid Factor  &lt; 20.0 IU/mL    Less Than 20  

 

 Anti-Nuclear Antibody  &lt;0.1 U    &lt;=1.0  45









 Laboratory test finding  2009  CRP High Sensitivity MML  1.4 mg/L    &lt;
=3.0  46

 

 Laboratory test finding  2009  [facility] [place] Panel  (SEE NOTE)      47









 [facility] Peanut  (SEE NOTE)      48

 

 [facility] Walnuts  (SEE NOTE)      49









 Laboratory test finding  2009  [facility] [place] Panel  (SEE NOTE)      50









 [facility] Peanut  (SEE NOTE)      51

 

 [facility] Walnuts  (SEE NOTE)      52

 

 [employer] [employer]  (SEE NOTE)      53









 Basic Metabolic Panel  2008  Sodium  136 mmol/L    135-145  









 Potassium  4.3 mmol/L    3.5-5.0  

 

 Chloride  104 mmol/L    101-111  

 

 Co2 (Carbon Dioxide)  27.0 mmol/L    22-32  

 

 Anion Gap  5.0 mmol/L    2-11  54

 

 Glucose  100 mg/dL      55

 

 BUN  15 mg/dL    6-24  

 

 Creatinine  0.66 mg/dL    0.50-1.40  

 

 One Over Creatinine  1.50      

 

 BUN/Creatinine Ratio  22.7  High  8-20  

 

 Calcium  9.0 mg/dL    8.1-9.9  56









 Laboratory test finding  2008  Estradiol  98 pg/mL      57

 

 FSH And LH  2008  FSH  1.98 MIU/ML      58









 Lutenizing Hormone  1.34 MIU/ML      59









 Laboratory test finding  2008  Rheumatoid Factor  &lt; 20.0 IU/mL    
Less Than 20  









 Lyme Disease Serology  Negative    Negative  60

 

 Erythrocyte Sed Rate  11 MM/HR    0-15  

 

 C Reactive Protein  0.6 mg/dL  High  Less Than 0.5  

 

 Anti Dna (Double Stranded Dna)  NEGATIVE    Negative  









 Protime  2008  Protime  12.3    10.9-13.3  









 Inr  1.03      61









 CBC With Manual Diff  2008  White Blood Count  8.1 CUMM    4.8-10.8  









 Red Cell Count  4.45 CUMM    4.2-5.4  

 

 Hemoglobin  13.1 g/dL    12.0-16.0  

 

 Hematocrit  37 %    35-47  

 

 Mean Corpuscular Volume  84 um3    79-97  

 

 Mean Corpuscular Hemoglob  29 pg    27-31  

 

 Mean Corpuscular HGB Cone  35 g/dL    32-36  

 

 Redcell Distribution WDTH  13 %    10.5-15  

 

 Platelet Count  248 CUMM    150-450  

 

 Mean Platelet Volume  8.4 um3    7.4-10.4  

 

 Polysegmented Neutrophil  82 %    38-83  

 

 Band Neutrophil  3 %    0-8  

 

 Lymphocyte  9 %  Low  25-47  

 

 Monocyte  4 %    0-13  

 

 Eosenophil  1 %    0-6  

 

 Atypical Lymph  1 %    0-6  

 

 Absolute Neutrophil Count  6.8      

 

 Anisocytosis  SLIGHT      









 Lipid Profile (Trig/Chol/HDL)  2008  Triglyceride  99 mg/dL      62









 Cholesterol  165 mg/dL    Less Than 200  62, 63

 

 High Density Lipoprotein  44 mg/dL    40-60  62, 64

 

 Cholesterol/HDL Ratio  3.75 AVERAGE    1-4.44  62

 

 Low Density Lipoprotein  101 mg/dL  High  Less Than 100  62, 65









 1  Would you like an EBV if Monospot is Negative?: N

 

 2  Would you like an EBV if Monospot is Negative?: N

 

 3  Low risk: &lt;1.00



   Average risk: 1.00-3.00



   High risk: &gt;3.00

 

 4  *******Because ethnic data is not always readily available,



   this report includes an eGFR for both -Americans and



   non- Americans.****



   The National Kidney Disease Education Program (NKDEP) does



   not endorse the use of the MDRD equation for patients that



   are not between the ages of 18 and 70, are pregnant, have



   extremes of body size, muscle mass, or nutritional status,



   or are non- or non-.



   According to the [employer] Kid[employer], irrespective of



   diagnosis, the stage of the disease is based on the level of



   kidney function:



   Stage Description                      GFR(mL/min/1.73 m(2))



   1     Kidney damage with normal or decreased GFR       90



   2     Kidney damage with mild decrease in GFR          60-89



   3     Moderate decrease in GFR                         30-59



   4     Severe decrease in GFR                           15-29



   5     Kidney failure                       &lt;15 (or dialysis)

 

 5  Serologic response to B. burgdorferi infection is not



   detected, but cannot rule out early infection during



   which low or undetectable antibody levels to



   B. burgdorferi may be present. If clinically indicated,



   a new serum specimen should be submitted in 7-14 days.



   Test Performed by:



   [place] [id number]



   : Celso Mix II, M.D., Ph.D.

 

 6  -- REFERENCE VALUE --



   25-HYDROXY D TOTAL (D2+D3) Optimum levels in the healthy



   population are 20-50, patients with bone disease may



   benefit from higher levels within this range.



   Test Performed by:



   [place] [id number]



   : Dilan Barrios III, M.D.

 

 7  *******Because ethnic data is not always readily available,



   this report includes an eGFR for both -Americans and



   non- Americans.****



   The National Kid[employer] Education Program (NKDEP) does



   not endorse the use of the MDRD equation for patients that



   are not between the ages of 18 and 70, are pregnant, have



   extremes of body size, muscle mass, or nutritional status,



   or are non- or non-.



   According to the [employer], irrespective of



   diagnosis, the stage of the disease is based on the level of



   kidney function:



   Stage Description                      GFR(mL/min/1.73 m(2))



   1     Kidney damage with normal or decreased GFR       90



   2     Kidney damage with mild decrease in GFR          60-89



   3     Moderate decrease in GFR                         30-59



   4     Severe decrease in GFR                           15-29



   5     Kidney failure                       &lt;15 (or dialysis)

 

 8  Class 0 (Negative &lt;0.35)



   Test Performed by:



   [place] [id number]



   : Dilan Barrios III, M.D.

 

 9  Class 0 (Negative &lt;0.35)



   Test Performed by:



   [place] [id number]



   : Dilan Barrios III, M.D.

 

 10  Class 0 (Negative &lt;0.35)



   Test Performed by:



   [place] [id number]



   : Dilan Barrios III, M.D.

 

 11  Class 0 (Negative &lt;0.35)



   Test Performed by:



   [place] [id number]



   : Dilan Barrios III, M.D.

 

 12  Class 0 (Negative &lt;0.35)



   Test Performed by:



   [place] [id number]



   : Dilan Barrios III, M.D.

 

 13  Class 0 (Negative &lt;0.35)



   Test Performed by:



   [place] [id number]



   : Dilan Barrios III, M.D.

 

 14  Class 0 (Negative &lt;0.35)



   Test Performed by:



   [place] [id number]



   : Dilan Barrios III, M.D.

 

 15  Class 0 (Negative &lt;0.35)



   Test Performed by:



   [place] [id number]



   : Dilan Barrios III, M.D.

 

 16  Class 0 (Negative &lt;0.35)



   Test Performed by:



   [place] [address]



   : Dilan Barrios III, M.D.

 

 17  Class 0 (Negative &lt;0.35)



   Test Performed by:



   [place] [address]



   : Dilan Barrios III, M.D.

 

 18  Class 0 (Negative &lt;0.35)



   Test Performed by:



   [place] [address]



   : Dilan Barrios III, M.D.

 

 19  Class 0 (Negative &lt;0.35)



   Test Performed by:



   [place] [address]



   : Dilan Barrios III, M.D.

 

 20  HDL Interpretation:



   Undesirable: High Risk:  Less than 40 mg/dL



   Desirable:  Low Risk:  Greater than 60 mg/dL

 

 21  LDL Interpretation:



   Low Risk Optimal Level:  LDL Less than 100 mg/dL



   Near or Above Optimal:  -129 mg/dL



   Borderline High Risk:  -159 mg/dL



   High Risk:  -189 mg/dL



   Very High Risk:  LDL Greater than 189 mg/dL

 

 22  FASTING

 

 23  -- REFERENCE VALUE --



   25-HYDROXY D TOTAL (D2+D3)



   Optimum levels in the normal population are 25-80



   Test Performed by:



   [facility] [place] [address]



   : Dilan Barrios III, M.D.

 

 24  *******Because ethnic data is not always readily available,



   this report includes an eGFR for both -Americans and



   non- Americans.****



   The National Kidney Disease Education Program (NKDEP) does



   not endorse the use of the MDRD equation for patients that



   are not between the ages of 18 and 70, are pregnant, have



   extremes of body size, muscle mass, or nutritional status,



   or are non- or non-.



   According to the National Kidney Foundation, irrespective of



   diagnosis, the stage of the disease is based on the level of



   kidney function:



   Stage Description                      GFR(mL/min/1.73 m(2))



   1     Kidney damage with normal or decreased GFR       90



   2     Kidney damage with mild decrease in GFR          60-89



   3     Moderate decrease in GFR                         30-59



   4     Severe decrease in GFR                           15-29



   5     Kidney failure                       &lt;15 (or dialysis)

 

 25  Interpretation: 10-24 (mild to moderate deficiency)



   



   -- REFERENCE VALUE --



   25-HYDROXY D TOTAL (D2+D3)



   Optimum levels in the normal population are 25-80



   



   Test Performed by:



   [facility]



   [address] [place] [id number]



   : Dilan Barrios III, M.D.

 

 26  CDC CLASSIFICATIONS FOR BLOOD LEAD CONCENTRATION SCREENING



   IN CHILDREN:



   CDC CLASS*           BLOOD LEAD CONCENTRATION (MCG/DL)



   I                     LESS THAN OR EQUAL TO 9



   IIA                    10 - 14



   IIB                    15 - 19



   III                    20 - 44



   IV                    45 - 69



   V                     GREATER THAN OR EQUAL TO 70



   *REFER TO CURRENT CDC GUIDELINES FOR COMMENTS AND



   INTERVENTIONS RECOMMENDED FOR EACH CLASS.



   



   *******CERTIFICATE OF BLOOD LEAD TESTING*******



   THIS IS TO CERTIFY THAT THE ABOVE NAMED PATIENT HAS



   BEEN TESTED FOR BLOOD LEAD.  TESTING WAS PERFORMED BY



   [facility] WHICH IS



   LICENSED BY [facility] TO PERFORM BLOOD LEAD



   TESTING.



   



   THIS CERTIFICATE IS PROVIDED AS A SERVICE TO OUR



   CLIENTS AND THEIR PATIENTS WHO MAY BE REQUIRED TO



   PRODUCE DOCUMENTATION OF BLOOD LEAD TESTING.



   .

 

 27  Test Performed by:



   [facility]



   [address] [place] [address]



   : Dilan Barrios III, M.D.

 

 28  Anion gap measurement may be of limited value in the



   presence of any alkalosis, especially in a combined acid



   base disorder.



   .

 

 29  A metabolite of Naproxen, O-desmethylnaproxen, has been



   shown to interfere with the [place] method for



   measuring total bilirubin.  Samples from patients who have



   taken Naproxen have shown spurious elevation in total



   bilirubin levels.

 

 30  *******Because ethnic data is not always readily available,



   this report includes an eGFR for both -Americans and



   non- Americans.****



   The National Kidney Disease Education Program (NKDEP) does



   not endorse the use of the MDRD equation for patients that



   are not between the ages of 18 and 70, are pregnant, have



   extremes of body size, muscle mass, or nutritional status,



   or are non- or non-.



   According to the National Kidney Foundation, irrespective of



   diagnosis, the stage of the disease is based on the level of



   kidney function:



   Stage Description                      GFR(mL/min/1.73 m(2))



   1     Kidney damage with normal or decreased GFR       90



   2     Kidney damage with mild decrease in GFR          60-89



   3     Moderate decrease in GFR                         30-59



   4     Severe decrease in GFR                           15-29



   5     Kidney failure                       &lt;15 (or dialysis)

 

 31  CHOLESTEROL INTERPRETATION:



   Desirable:  Less than 200 MG/DL



   Borderline-High Risk:  200-239 MG/DL



   High-Risk:  240 MG/DL and over

 

 32  HDL INTERPRETATION:



   Undesirable: High Risk:  Less than 40 MG/DL



   Desirable:  Low Risk:  Greater than 60 MG/DL

 

 33  LDL INTERPRETATION:



   Low Risk Optimal Level:  LDL Less than 100 MG/DL



   Near or Above Optimal:  -129 MG/DL



   Borderline High Risk:  -159 MG/DL



   High Risk:  -189 MG/DL



   Very High Risk:  LDL Greater than 189 MG/DL

 

 34  Test Performed by:



   [facility]



   [address] [place] [address]



   : Dilan Barrios III, M.D.

 

 35  Serologic response to B. burgdorferi infection is not



   detected, but cannot rule out early infection during



   which low or undetectable antibody levels to



   B. burgdorferi may be present. If clinically indicated,



   a new serum specimen should be submitted in 7-14 days.



   



   Test Performed by:



   [facility]



   [address] [place] [address]



   : Dilan Barrios III, M.D.

 

 36  FINAL INTERPRETATION:



   **  No HIV antibody is detected.  **



   .



   



   This information has been disclosed to you from confidential



   records which are protected by [place] State law.  State



   law prohibits you from making further disclosure of this



   information without the specific written consent of the



   person to whom it pertains, or as otherwise permitted by



   law.  Any unauthorized further disclosure in violation of



   state law may result in a fine or alf sentence or both.



   General authorization for the release of medical or other



   information is not, except in limited circumstances set



   forth in Part 63, Title 10, of [facility], sufficient



   authorization for further disclosure.  Disclosure of



   confidential HIV information that occurs as the result of a



   general authorization for the release of medical or other



   information will be in violation of the state law and may



   result in a fine or a alf sentence.



   .

 

 37  -- REFERENCE VALUE --



   25-HYDROXY D TOTAL (D2+D3)



   Optimum levels in the normal



   population are 25-80



   Test Performed by:



   [facility]



   [address] [place] [address]



   : Dilan Barrios III, M.D.

 

 38  Test Performed by:



   [facility]



   [address] [place] [address]



   : Dilan Barrois III, M.D.

 

 39  REFERENCE RANGE: AM  8.7-22.4



   PM  LESS THAN 10



   .

 

 40  Test Performed by:



   [facility]



   [address] [place] [address]



   : Dilan Barrios III, M.D.

 

 41  Test Performed by:



   [facility]



   [address] [place] [address]



   : Dilan Barrios III, M.D.

 

 42  ** Note change in reference range as of 08.  The



   change was based on recommendations from the American



   Diabetes Association.

 

 43  -- REFERENCE VALUE --



   25-HYDROXY D TOTAL (D2+D3)



   Optimum levels in the normal



   population are 25-80



   Test Performed by:



   [facility]



   [address] [place] [address]



   : Dilan Barrios III, M.D.

 

 44  Test Performed by:



   [facility]



   [address] [place] [address]



   : Dilan Barrios III, M.D.

 

 45  Interpretation: Negative (&lt;=1.0)



   



   Test Performed by:



   [facility]



   [address] [place] [address]



   : Dilan Barrios III, M.D.

 

 46  Average risk



   



   Test Performed by:



   [facility]



   [address] [place] [address]



   : Dilan Barrios III, M.D.

 

 47  TEST RESULT RETURNED FROM [facility] LAB[facility] AND HARDCOPY



   SENT TO PHYSICIAN(S) OFFICE.

 

 48  TEST RESULT RETURNED FROM REFERENCE LABORATORY AND HARDCOPY



   SENT TO PHYSICIAN(S) OFFICE.

 

 49  TEST RESULT RETURNED FROM REFERENCE LABORATORY AND HARDCOPY



   SENT TO PHYSICIAN(S) OFFICE.

 

 50  TEST RESULT RETURNED FROM REFERENCE LABORATORY AND HARDCOPY



   SENT TO PHYSICIAN(S) OFFICE.

 

 51  TEST RESULT RETURNED FROM REFERENCE LABORATORY AND HARDCOPY



   SENT TO PHYSICIAN(S) OFFICE.

 

 52  TEST RESULT RETURNED FROM REFERENCE LABORATORY AND HARDCOPY



   SENT TO PHYSICIAN(S) OFFICE.

 

 53  TEST RESULT RETURNED FROM REFERENCE LABORATORY AND HARDCOPY



   SENT TO PHYSICIAN(S) OFFICE.

 

 54  Anion gap measurement may be of limited value in the



   presence of any alkalosis, especially in a combined acid



   base disorder.



   .

 

 55  ** Note change in reference range as of 08.  The



   change was based on recommendations from the American



   Diabetes Association.

 

 56  Please note change in reference range effective 08



   



   



   .

 

 57  EXPECTED RESULTS



   (pg/ml)



   



   MALES                                  20-75



   POSTMENOPAUSAL FEMALES                 20-88



   ____________________________________________________________



   



   NON PREGNANT FEMALES



   



   Mid-Follicular Phase                   



   Periovulatory                          



   Mid Luteal Phase                       

 

 58  NORMAL RANGE



   -------------------------------------------------



   MALES                              1 - 20



   



   NORMALLY MENSTRUATING FEMALES



   - Follicular Phase                 3 - 9



   - Mid-Cycle Peak                   4 - 23



   - Luteal Phase                     1 - 6



   



   POSTMENOPAUSAL FEMALES            16 - 114



   ----------------------------------------------------------



   



   .

 

 59  NORMAL RANGE



   --------------------------------------------------



   MALES                                 2 - 12



   NORMALLY MENSTRUATING FEMALES



   - Follicular Phase                    1 - 18



   - Mid-Cycle Peak                     24 - 105



   - Luteal Phase                      0.6 - 20



   



   POSTMENOPAUSAL FEMALES               15 - 62



   -----------------------------------------------------------



   .

 

 60  Test Performed by:



   [facility]



   [address] [place] [address]



   : Dilan Barrios III, M.D.

 

 61  ELYSE VALUE=2.01 (AS OF 07



   



   Recommended INR for Patients



   on Oral Anticoagulants



   



   



   Prophylaxis                       2.0 - 3.0



   Treatment of thrombosis           2.0 - 3.0



   Prevention of embolism            2.0 - 3.0



   Prevention of embolism from



   prosthetic heart valves        2.5 - 3.5

 

 62  FASTING

 

 63  CHOLESTEROL INTERPRETATION:



   Desirable:  Less than 200 MG/DL



   Borderline-High Risk:  200-239 MG/DL



   High-Risk:  240 MG/DL and over

 

 64  HDL INTERPRETATION:



   Undesirable: High Risk:  Less than 40 MG/DL



   Desirable:  Low Risk:  Greater than 60 MG/DL

 

 65  LDL INTERPRETATION:



   Low Risk Optimal Level:  LDL Less than 100 MG/DL



   Near or Above Optimal:  -129 MG/DL



   Borderline High Risk:  -159 MG/DL



   High Risk:  -189 MG/DL



   Very High Risk:  LDL Greater than 189 MG/DL







Procedures







 Date  CPT Code  Description  Status

 

 2014  17071  Treadmill Interp/Report Only  Completed

 

 2014  60190  Stress Test Supervsn W/Out I/R  Completed

 

 2014  25547  EKG Tracing &amp; Interpretation  Completed

 

 2014  19268  EKG Tracing &amp; Interpretation  Completed

 

 2014  12400  EKG Tracing &amp; Interpretation  Completed

 

 2014  48860  EEG Recording Awake &amp; Drowsy  Completed

 

 2011  23255  EKG Tracing &amp; Interpretation  Completed

 

 2011  03434  EKG Tracing &amp; Interpretation  Completed

 

 2010  27851  EKG Tracing &amp; Interpretation  Completed

 

 2010    Mammogram  Completed







Encounters







 Type  Date  Location  Provider  CPT E/M  Dx

 

 Office Visit  2018  9:50a  [facility]  Kelsi Thomas NP  [address]  
Z00.00



     Medicine - [address]      









 R53.83









 Office Visit  2018  8:50a  [facility]  Israel Valderrama,  
[address]  R53.83



     Infectious Diseases  MELENA    

 

 Office Visit  2018 11:40a  [facility]  Gus Winchester NP  [address]86[address]  
A69.20



     - [place]      









 R51









 Office Visit  2016  4:00p  [facility]  Riaz Olson,  
[address]776  R53.82



     - Francie RAI    

 

 Office Visit  2014  4:20p  [facility]  Yenni Avila M.D.  
90791  401.1



     - [place]      

 

 Office Visit  2014  2:00p  [facility]  Alice Sharp M.D.  
53330  786.50



     [facility]      









 401.1









 Office Visit  2014  3:40p  [facility]  Yenni Avila M.D.  
75718  401.9



     - [place]      









 786.50

 

 309.81









 Office Visit  01/10/2014  1:00p  [facility]  Gladys Contreras M.D.  [address]
  780.02



     [facility]      

 

 Office Visit  2013  9:00a  [facility]  Gladys Contreras M.D.  [address]
  784.59



     [facility]      









 781.0

 

 346.70

 

 300.00









 Office Visit  10/17/2013  1:00p  [facility] -  Beena Crawley, N.P.
  [address]  311



     [place]      









 723.1









 Office Visit  2013  3:00p  [facility]  Beena Crawley, N.P.  
26961  780.39



     - [place]      









 311

 

 309.81

 

 729.1

 

 268.9

 

 272.0

 

 346.00

 

 V76.12

 

 V70.9

 

 238.2

 

 727.1

 

 524.60









 Office Visit  2013  2:00p  [facility]  Beena Crawley, N.P.  
10451  780.39



     - [place]      









 311

 

 309.81

 

 729.1

 

 V77.1

 

 V77.91









 Office Visit  2013  3:30p  [facility]  Beena Crawley, N.P.  
12908  309.81



     - [place]      









 311

 

 729.1









 Office Visit  2013 11:20a  [facility]  HARRISON ArangoOSergey  
64968  729.1









 786.50









 Office Visit  02/15/2013  3:30p  [facility]  Beena Crawley, N.P.  
53435  724.2



     - [place]      









 729.1

 

 524.60

 

 268.9

 

 V76.10

 

 V77.91

 

 V77.1









 Office Visit  2013  4:00p  [facility]  Riaz Olson,  
44020  729.1



     - Francie RAI    









 783.1

 

 268.9









 Office Visit  2013  3:40p  [facility]  Riaz Olson  
62330  729.1



     - Francie RAI    









 796.2

 

 268.9

 

 272.0

 

 783.1









 Office Visit  2012  2:40p  [facility]  Tawnya Will,  
67274  786.50



     [facility]  D.OSergey    









 272.4

 

 796.2

 

 729.1









 Office Visit  2012  9:40a  [facility]  Riaz Olson,  
27048  893.0



     - Francie RAI    

 

 Office Visit  10/11/2011  2:20p  DO Not Use [facility]  [facility]  [address]  
382.9



     [facility]  SUSHMA.HARRISON    









 E988.1

 

 783.1









 Office Visit  2011  3:40p  DO Not Use Cma At  [facility],  [address]  
729.1



     [facility]  SUSHMA.HARRISON    









 780.79









 Office Visit  2011  9:40a  DO Not Use Cma At  [facility],  [address]  
462



     [facility]  SUSHMA.HARRISON    

 

 Office Visit  2011  9:40a  [facility]  Tawnya Will,  [address]  
786.50



       D.O.    









 785.1









 Office Visit  2011  3:40p  DO Not Use Cma At  [facility],  26739  
786.50



     [facility]  SUSHMA.HARRISON    

 

 Office Visit  07/15/2010  2:40p  [facility] Not Use [facility]  [address]  
729.1



     Addysebastian RAI    

 

 Office Visit  2010 12:00p  DO Not Use Cma At  [facility] [place],  [address]  
786.51



     Addysebastian RAI,FACP    









 530.81

 

 729.1









 Office Visit  2010  1:40p  DO Not Use Cma At  [facility] [place],  [address]  
268.9



     [facility]  M.D.,FACP    









 729.1









 Office Visit  2010  8:45a  DO Not Use Cma At  Felix Ramires M.D.  
77247  524.60



     [facility]      









 729.1









 Office Visit  2010  3:00p  DO Not Use Cma At  [facility] [place],  [address]  
729.1



     Addysebastian RAI,FACP    









 255.5









 Office Visit  2010 11:30a  DO Not Use [facility] At  [place]  [address]  
729.1



     [place]      









 309.0









 Office Visit  2010  9:00a  DO Not Use Cma At  [place]  [address]  
729.1



     [facility]      

 

 Office Visit  2009  3:00p  DO Not Use Cma At  [place]  [address]  
729.1



     [facility]      

 

 Office Visit  2009  2:00p  DO Not Use Cma At  [place]  [address]  
729.1



     [facility]      

 

 Office Visit  2009 11:00a  DO Not Use Cma At  [place]  [address]  
787.02



     [facility]      









 338.4

 

 292.0









 Office Visit  2009  9:30a  DO Not Use [facility] At  [facility]  [address]  
780.79



     [facility]      

 

 Office Visit  2009  8:30a  DO Not Use Cma At  [employer]  [address]  
719.47



     [facility]      

 

 Office Visit  2009 11:30a  DO Not Use Cma At  [employer]  [address]  
729.1



     [facility]      

 

 Office Visit  2009  3:00p  DO Not Use Cma At  [facility]  [address]  
729.1



     [facility]      

 

 Office Visit  2008  2:30p  DO Not Use Cma At  [employer]  [address]  
338.4



     [facility]      

 

 Office Visit  10/22/2008  3:30p  DO Not Use Cma At  [employer] [place]  [address]  
729.1



     [facility]      









 309.0









 Office Visit  10/20/2008  9:00a  DO Not Use Cma At  [facility]  [address]  
729.1



     [facility]      

 

 Office Visit  2008  2:00p  DO Not Use Cma At  [employer]  [address]  
782.7



     [facility]      









 729.1

 

 625.4









 Office Visit  07/15/2008  9:00a  DO Not Use Cma At  [employer]  [address]  
729.1



     [facility]      

 

 Office Visit  2008  9:00a  DO Not Use Cma At  [employer]  [address]  
729.1



     [facility]      

 

 Office Visit  2008  9:30a  DO Not Use Cma At  [place]  [address]  
461.9



     [facility]      









 729.1









 Office Visit  2007  9:20a  DO Not Use Cma At  [place],  [address]  
461.9



     Addysebastian RAI,FACP    









 V04.81









 Office Visit  2007  3:20p  DO Not Use Cma At  [place] Manuel DSergey Te[place],  [address]  
780.39



     Tiana RAI,FACP    









 780.52

 

 346.00







Plan of Care

Future Appointment(s):2018  3:00 pm - Brady Monroe M.D. at [facility] 
[facility]02/15/2018 - Gus Winchester, NPR51 HeadacheNew Medication:Baclofen 
10 mgComments:It is important to stay well hydrated. The goal is at least 64 
ounces of water daily.Try to limit caffeine.Try to limit over the counter 
analgesics such as Tylenol and ibuprofen as these can cause rebound headaches. 
Using an ice pack on your head when having a headache can help. Trying to limit 
tension and reduce stress are also important.  I have prescribed the muscle 
relaxant that w ediscussed. This may make you tired. I am referring you to 
neurology for further revaluation of your symptoms.Referral:Gladys Contreras MD, 
FbzjwopnyG10.83 Other fatigueComments:It is important to get the bloodwork done 
that was ordered by Faith Thomas. Continue following with Dr. Barry your 
appointment with Dr. Monroe.

## 2018-02-19 NOTE — XMS REPORT
Flora Figueroa

 Created on:2018



Patient:Flora Figueroa

Sex:Female

:1970

External Reference #:[id number]





Demographics







 Address  [address]



   [place] [address]

 

 Mobile Phone  [phone]

 

 Email Address  [email or web address]

 

 Preferred Language  English

 

 Marital Status  Not  Or 

 

 Jew Affiliation  Unknown

 

 Race  White

 

 Ethnic Group  Not  Or 









Author







 Organization  [facility]

 

 Address  [address] [address]

 

 Phone  [phone]









Care Team Providers







 Name  Role  Phone

 

 Riaz Olson III, MD  Primary Care Physician  Unavailable









Payers







 Type  Date  Identification Numbers  Payment Provider  Subscriber

 

 Medicare Primary  Effective:  Policy Number:  Medicare  Flora Figueroa



   2013  [id number]    









 PayID: [id number]  [address]









 [place] [address]









 [facility]    Policy Number: [id number]  Medicaid  Flora Figueroa









 Group Name: 1 1  [address]

 

 PayID: [id number]  [place] [address]







Problems







 Date  Description  Provider  Status

 

 Onset: 2007  Seizure  Jose Manuel Tejada M.D.,FACP  Active

 

 Onset: 2007  Insomnia  Jose Manuel Tejada M.D.,FACP  Active

 

 Onset: 2007  Migraine with typical aura  Jose Manuel Tejada M.D.,FACP  
Active

 

 Onset: 2012  Chest pain  Tawnya Will D.O.  Active

 

 Onset: 2013  Myalgia &amp; Myositis Unspec  Riaz Olson M.D.  
Active

 

 Onset: 2013  Pure hypercholesterolemia  Riaz Olson M.D.  Active







Family History







 Date  Family Member(s)  Problem(s)  Comments

 

   General  Coronary Artery Disease (CAD)  M bro  50's MI,



       and other family



       members on maternal



       side

 

 :  (age  Father   due to MI  (+) smoker



 48 Years)      

 

   Father  Coronary Artery Disease (CAD)   50 yo MI

 

 Onset:  (age 19  Mother  Hypertension  



 Years)      

 

   Mother   due to MI  () - age



       57; no prior heart



       problems. (+)



       smoker, HTN

 

   Mother  Hypercholesterolemia  

 

   Mother  Coronary Artery Disease (CAD)   59 yo MI

 

   Paternal Grandmother  Hypertension  

 

   Maternal Grandfather   due to MI  () - age



       60's

 

   Maternal Grandmother   due to MI  () - age



       60's







Social History







 Type  Date  Description  Comments

 

 Marital Status    Single  

 

 Lives With    Daughter  

 

 Occupation    currently not working  

 

 Cigarette Use    Never Smoked Cigarettes  

 

 ETOH Use    Rarely consumes alcohol  

 

 Recreational Drug Use    Denies Drug Use  

 

 Smoking    Patient has never smoked  

 

 Daily Caffeine    Consumes on average 1 cup of  



     regular coffee per day  

 

 Enjoy Exercising    Does not enjoy exercising  

 

 Exercise Type/Frequency    Exercises regularly  occasionally walking

 

 General Hx Text      1 daughter







Allergies, Adverse Reactions, Alerts







 Date  Description  Reaction  Status  Severity  Comments

 

 2010  Wellbutrin XL  agitation, anxiety and  active    



     muscle cramps      

 

 2011  Lyrica  edema  active    

 

 2011  Gluten    active    

 

 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  

 

 2013  Neurontin  hyperactive  active    

 

 2013  Clonazepam  aggitated  active    

 

 10/17/2013  Tizanidine    active    







Medications







 Medication  Date  Status  Form  Strength  Qnty  SIG  Indications  Ordering



                 Provider

 

 Doxycycline  /  Active  Capsules  100mg  42cap  si  A69.20  Gus



 Monohydrate  2018        s  twice a day    ROSAS Winchester



             x 21 days    

 

 Naproxen  /  Active  Tablets  500mg  20tab  1 tablet  R51  Gus



   2018        s  with food    ROSAS Winchester



             by mouth    



             twice a day    



             as needed    

 

 Blood Pressure  /  Active  Misc    1unit  in the  401.9  Yenni



 Monitor          s  morning and    Hardeep Avila/Manual            at night    M.DSergey



 Inflate                

 

 Garlic  /  Active        daily    Unknown



   0000              

 

 Adderall XR  /  Active  Caps ER  30mg    1 by mouth    Unknown



   0000    24HR      every day    

 

 Klonopin  /  Active  Tablets  1mg    1po  a day    Unknown



   0000              

 

 Cholestyramine  /  Active  Packet  4gm    mix one    Sherry,



 Guerrero  0000          packet in    nayeli Griffin and    MD



             take by    



             mouth twice    



             a day    

 

 Zinc  /  Active  Tablets  50mg    1 tab daily    Unknown



   0000          (otc)    

 

 B6 Natural  0000/  Active  Tablets  50mg    take one    Unknown



   0000          capsule/tab    



             let daily    



             by mouth    

 

 D3-1000  0000/  Active  Capsules  5000Unit    please take    Unknown



   0000          1 tab daily    

 

 Zyflammend  /  Active        2 caps    Unknown



   0000          daily    

 

 Probiotic  /  Active  Capsules      1 by mouth    Unknown



   0000          every day    

 

                 

 

 Cholestyramine  /  Hx  Packet  4gm  50uni  as directed    Yenni Avila,



                 MELENA



   2016              

 

 Propranolol HCL  /  Hx  Caps ER  80mg  30cap  1 tab po in  401.1  Yenni SANDERS  2014 -    24HR    s  the evening    Austin



                 MELENA



   2016              

 

 Lisinopril  /  Hx  Tablets  10mg  30tab  1 by mouth  401.9  Yenni



    Arley        s  every day    Austin



   ELENA



                 

 

 Aspir-81  /  Hx  Tablets  81mg  100ta  1 by mouth  401.9  Yenni Tubbs DR    bs  every day    Austin



                 MELENA



                 

 

 Atenolol  /  Hx  Tablets  25mg  90tab  1 by mouth  401.9  Yenni



    Arley        s  every day    Austin



                 MELENA



                 

 

 Propranolol HCL  /  Hx  Tablets  10mg    1 by mouth    Other



   2014 -          three times    Ordering



   /          a daily    Provider



                 

 

 Lamotrigine  /  Hx  Tablets  25mg  60tab  take 1 po    Gladys



    -        s  qhs x 2    Ben,



   /          weeks, then    M.DSergey



   2014          1 po bid    

 

 Cyclobenzaprine  10/17/  Hx  Tablets  10mg  30tab  one po tid  723.1  Beena



 HCL   -        s  prn spasm    Misbah,



   /              N.P.



                 

 

 Ibuprofen  /  Hx  Tablets  200mg  100ta  3 tablets    Beena



    Arley        bs  prn    Misbah,



   /              N.P.



                 

 

 Cyclobenzaprine  /  Hx  Tablets  5mg  20tab  1 tab po  729.1  Beena



 HCL   -        s  tid prn    Misbah,



   10/17/              N.P.



   2013              

 

 Venlafaxine HCL  /  Hx  Tablets  225mg  30tab  1 tab po qd    Beena



 ER   -    ER 24HR    s      Misbah,



   /              N.P.



   2013              

 

 Tramadol HCL  /  Hx  Tablets  50mg  90tab  1 tab po q  729.1  Beena



    -        s  4-6 hours    Misbah,



   /          prn;    N.P.



   2013              

 

 Aleve  /  Hx  Capsules  220mg  60cap  prn    Other



    -        s      Ordering



   /              Provider



   2016              

 

 Adderall  /  Hx  Tablets  20mg  60tab  1 po bid    Other



    -        s      Ordering



   /              Provider



   2014              

 

 Gabapentin  02/15/  Hx  Capsules  300mg  90cap  300 mg po  729.1  Beena



    -        s  qd x1 day,    Misbah,



   /          then 300 mg    N.P.



   2013          po bid x1    



             day, then    



             300 mg po    



             tid    

 

 Cyclobenzaprine  02/15/  Hx  Tablets  5mg  30tab  1 tab po  524.60  Beena



 HCL   -        s  qhs    Misbah,



   /              N.P.



   2013              

 

 Ergocalciferol  02/15/  Hx  Capsules  46299Feoq  12cap  1 cap po q1  268.9  
Beena



    -        s  week for 2    Misbah,



   /          months then    N.P.



   2013          2x/mon    

 

 Butrans  /  Hx  Patches  10mcg/HR  4unit  apply 1    Riaz CHEN



    -    Weekly    s  patch    Whitney,



   /          weekly    M.D.



   2013              

 

 Prazosin HCL  /  Hx  Capsules  1mg    1 po tid    Riaz CHEN



   2012 -              Whitney,



   /              M.D.



   2012              

 

 Ibuprofen  10/11/  Hx  Tablets  600mg  45tab  tid  382.9  [place]ollywood



    -        s      Pachikara



   /              , M.D.



   2013              

 

 Amoxicillin  /  Hx  Capsules  500mg  30cap  1 tid for  462  Riaz CHEN



    -        s  10 days    Whitney,



   /              M.D.



   2011              

 

 Duragesic-25  /  Hx  Patches  25mcg/HR  10uni  apply    Jose Manuel



    -    72HR    ts  topically    D. Terrell,



   10/13/          change q3    M.D.,FACP



             days    



               Use with    



             12mcg patch    

 

 Duragesic-12  /  Hx  Patches  12mcg/HR  10uni  topical q3d    Jose Manuel



    -    72HR    ts  with 25 mcg    D. Mena,



   10/11/          patch    M.D.,FACP



                 

 

 Duragesic-50  /  Hx  Patches  50mcg/HR  10uni  apply  729.1  Jose Manuel



    -    72HR    ts  topically    D. Mena,



   /          q3days    M.D.,FACP



   2010              

 

 Clonidine HCL  /  Hx  Tablets  0.1mg  60tab  1 po bid    Jose Manuel



    Arley Tejada,



   /              M.D.,FACP



   2011              

 

 Omeprazole  /  Hx  Capsules  20mg  30cap  1 po qd for  530.81  Jose Manuel gilliland  2 wks then    HARRISON Tejada,



   /          prn    M.D.,FACP



                 

 

 Savella Titration  /  Hx  Misc  12.5&amp;  1pak  as directed  729.1  Zohaib Chang   -      25&amp;50    samples    HARRISON Tejada,



   /      mg        M.D.,FACP



   2010              

 

 Tramadol HCL  /  Hx  Tablets  50mg  100ta  PO qid prn  729.1  Jose Manuel Tejada,



   /              M.D.,FACP



   2011              

 

 Duragesic-12  /  Hx  Patches  12mcg/HR  10uni  topical q3d    Jose Manuel



    -    72HR    ts  with 25 mcg    HARRISON Tejada,



   /          patch    M.D.,FACP



                 

 

 Adderall  /  Hx  Tablets  30mg  30tab  1 po daily    Jose Manuel Tejada,



   /              M.D.,FACP



   2013              

 

 Vitamin D  /  Hx  Capsules  1000Unit  30cap  2 po qd  268.9  Jose Manuel



    Arley Tejada,



   /              M.D.,FACP



   2012              

 

 Duragesic-25  /  Hx  Patches  25mcg/HR  10uni  apply  729.1  Jose Manuel



    -    72HR    ts  topically    HARRISON Tejada,



   /          change q3    M.D.,FACP



   2010          days    

 

 Lyrica  /  Hx  Capsules  25mg  60cap  1 po bid    Jose Manuel



    Arley Tejada,



   /              M.D.,FACP



                 

 

 Methadone HCL  /  Hx  Tablets  5mg  120ta  1-2 po bid    Jose Manuel



    -        bs  prn pain    HARRISON Tejada,



   /              M.D.,FACP



   2010              

 

 Hydrocodone-Aceta  /  Hx  Tablets  10-325mg  40tab  1 q 4- 6  729.1  Felix delaney  2010 -        s  hours prn    Keyla



   /          pain    M.D.



                 

 

 Methadone HCL  /  Hx  Solution  5mg/5ML  600ml  5-10mg bid  729.1  Zohaib Easley



    -          prn pain    HARRISON Tejada,



                 M.D.,FACP



   2010              

 

 Savella  /  Hx  Tablets  100mg  60tab  1 bid  729.1  Jose Manuel



    -        s      HARRISON Tejada,



   .D.,FACP



   2010              

 

 Zofran  /  Hx  Tablets  4mg  60tab  1 q 6 hours  787.02  Jose Manuel



   2009 -        s  prn nausea    HARRISON Tejada,



   .D.,FACP



                 

 

 Methadone HCL  /  Hx  Tablets  5mg  90tab  1 po tid    Jose Manuel



   2009 -        s      HARRISON Tejada,



   .D.,FACP



   2009              

 

 Methadone HCL  /  Hx  Tablets  10mg  180ta  1or 2 tabs    Jose Manuel



   2009 -        bs  tid prn    HARRISON Tejada,



             pain    M.D.,FACP



   2010              

 

 Oxycodone HCL  /  Hx  Capsules  5mg  240ca  2tab q6hr  729.1  Jose Manuel



   2009 -        ps  prn    HARRISON Tejada,



   .D.,FACP



                 

 

 Oxycontin  /  Hx  Tablets  20mg  60tab  1-2 q12h    Jose Manuel



   2009 -    ER 12HR    darshana Tejada,



   .D.,FACP



   2009              

 

 Oxycodone HCL  /  Hx  Tablets  10mg  120ta  1 q 6 hours  729.1  Jose Manuel



    -        bs  prn pain    HARRISON Tejada,



   .D.,FACP



                 

 

 Cymbalta  /  Hx  Caps DR  30mg  60cap  po qd    Jose Manuel



    -    PA    s      HARRISON Tejada,



   .D.,FACP



                 

 

 Lamictal  /  Hx  Tablets  25mg    2 po qd    Jose Manuel Lopez -              HARRISON Tejada,



   .D.,FACP



   2009              

 

 Methadone HCL  /  Hx  Tablets  10mg  120ta  1or 2 tabs  338.4  Jose Manuel



    -        bs  po tid    HARRISON Tejada,



   03/12/              M.D.,FACP



   2009              

 

 Duragesic  10/01/  Hx  Patches  50mcg/HR  10uni  1 patch q72  729.1  Jose Manuel



    -    72HR    ts      DSergey Tejada,



                 M.D.,FACP



   2008              

 

 Duragesic  /  Hx  Patches  25mcg/HR  10uni  topical  729.1  Jose Manuel



    -    72HR    ts  q3days    HARRISON Tejada,



                 M.D.,FACP



   2008              

 

 Ultram  /  Hx  Tablets  50mg  40tab  1 or 2 tabs    Riaz CHEN



   2008 -        s  q 6 hours    Whitney,



   /          prn pain    M.D.



   2013              

 

 Skelaxin  /  Hx  Tablets  800mg  90tab  1 q 8 hours    Jose Manuel



   2008 -        s  prn muscle    D. Terrell,



   10/22/          spasms    M.D.,FACP



   2008              

 

 Duragesic  07/15/  Hx  Patches  50mcg/HR  10uni  1 patch  729.1  Jose Manuel



    -    72HR    ts  every 72    D. Terrell,



   /          hours    M.D.,FACP



   2008              

 

 Hydrocodone/Apap  /  Hx  Tablets  10-325mg  180ta  1 q 4 hours  729.1  
Jose Manuel



   2008 -        bs  prn pain    HARRISON Tejada,



                 M.D.,FACP



   2010              

 

 Cephalexin  /  Hx  Capsules  500mg  30cap  1 tid x 10  461.9  Jose Manuel



   2008 -        s  days    HARRISON Tejada,



   07/15/              M.D.,FACP



   2008              

 

 Lyrica  /  Hx  Capsules  50mg  90cap  1 po tid  729.1  Jose Manuel



   2008 -        s      HARRISON Tejada,



   10/22/              M.D.,FACP



   2008              

 

 Methadone  /  Hx  Tablets  10mg  12tab  1tab tid x    Jose Manuel



   2008 -        s  2days    HARRISON Tejada,



   /              M.D.,FACP



   2008               1tab    



             bid x 2days    



                 



                 



             1tab qd x    



             2days    

 

 Lamictal  /  Hx  Tablets  25mg  3Mont  bid    Jose Manuel



   2007 -        h      HARRISON Tejada,



   /              M.D.,FACP



   2009              

 

 Amoxicillin  /  Hx  Capsules  500mg  40cap  2 bid for  461.9  Jose Manuel



   2007 -        s  10 days    HARRISON Tejada,



   /              M.D.,FACP



   2008              

 

 Remeron  /  Hx  Tablets  30mg    1  Tabs PO    Jose Manuel



    -          Q hs    HARRISON Tejada,



   /              M.D.,FACP



   2008              

 

 Ambien  /  Hx  Tablets  10mg  30tab  1 po qhs    Jose Manuel



    -        s  prn sleep    HARRISON Tejada,



   /          insomnia    M.D.,FACP



   2008              

 

 Lamictal  /  Hx  Tablets  25mg    qd,    Jose Manuel



    -          increasing    HARRISON Tejada,



   /              M.D.,FACP



                 

 

 Ambien  /  Hx  Tablets  10mg.  10tab  1 hs prn    Jose Manuel



         HARRISON Tejada,



   /              M.D.,FACP



                 

 

 Zoloft  /  Hx  Tablets  50mg  30tab  1 PO qd    Unknown



   0000 -        s      



   2008              

 

 Lunesta  /  Hx            Unknown



   0000 -              



   2009              

 

 Cymbalta  /  Hx  Caps DR  60mg    1 PO qd    Unknown



   0000 -    Part          



   2009              

 

 Abilify  /  Hx  Tablets  5mg    1 po qd    Unknown



    -              



   2010              

 

 Wellbutrin XL  /  Hx  Tablets  450  90tab  once qd  729.1  Unknown



   0000 -    ER 24HR    s      



   2010              

 

 Savella  /  Hx  Tablets  50mg  60tab  1 po bid  729.1  Jose Manuel



         HARRISON Tejada,



   /              M.D.,FACP



                 

 

 Effexor  /00/  Hx  Tablets  37.5mg    3 po qd    Unknown



   0000 -              



   2011              

 

 Klonopin  00/  Hx  Tablets  2mg    1/2-1 tab    Unknown



   0000 -          up to qid    



   /          prn    



                 

 

 Effexor XR  /00/  Hx  Caps ER  150mg    1 tab po qd    Unknown



   0000 -    24HR      am    



   2013              

 

 Adderall  00/00/  Hx  Tablets  10mg    1 by mouth    Unknown



   0000 -          daily    



   2012              

 

 Valium  00/00/  Hx  Tablets  10mg  10tab  1 po qd    Unknown



   0000 -        s      



   2011              

 

 Multi Vitamin  00/00/  Hx  Liquid      1 po qd    Unknown



   0000 -              



   2012              

 

 Remeron  00/00/  Hx  Tablets  15mg  90tab  1 po hs    Unknown



   0000 -        s      



   2011              

 

 Aspirin  00/00/  Hx  Tablets  81mg  50tab  1 po qd    Unknown



   0000 -    DR    s      



   2012              

 

 Doxepin HCL  00/  Hx  Capsules  25mg    one po qday    Unknown



   0000 -              



   2012              

 

 Butrans  00/00/  Hx  Patches  15mcg  4unit  topical    Unknown



   0000 -    Weekly    s  q7days    



   2012              

 

 Augmentin  /  Hx  Tablets  875mg  20tab  1 po bid    Unknown



   0000 -        s  for ten    



   10/13/          days    



   2011              

 

 Venlafaxine HCL  0000/  Hx  Caps ER  150mg  30cap  1 po bid    Unknown



 ER  0000 -    24HR    s      



   2012              

 

 Tizanidine HCL  /  Hx  Tablets  4mg  60tab  take 1    Unknown



   0000 -        s  tablet po    



    and in    



             the am for    



             spasm.    

 

 Clonazepam  00/00/  Hx  Tablets  1mg  90tab  1 po q bid    Unknown



   0000 -        s      



   2013              

 

 Adderall XR  /  Hx  Caps ER  30mg  30cap  1 capsule    Unknown



   0000 -    24HR    s  po qday    



   2012              

 

 Aleve  00/00/  Hx  Capsules  220mg  60cap  1 po bid    Unknown



   0000 -        s  prn    



   2012              

 

 Brainstorm  /00/  Hx            Unknown



    -              



   2013              

 

 Pete Oil  /00/  Hx            Unknown



    -              



   2013              

 

 Elisabet Sagar Colin  00/00/  Hx            Unknown



    -              



   2013              

 

 Naprosyn  /00/  Hx  Tablets    60tab  1 po bid    Unknown



   0000 -        s      



   2013              

 

 Zanaflex  00/  Hx  Capsules    270ca  po tid prn    Unknown



   0000 -        ps      



   2012              

 

 Aspirin DR  /  Hx  Tablets  81mg    1 po qd    Unknown



   0000 -    DR          



   2013              

 

 Prazosin HCL  00/  Hx  Capsules  2mg    1-2 prn    Unknown



    -              



   2013              

 

 Vitamin D3 High  /00/  Hx  Capsules  5000Unit    1 tabs    Unknown



 Potency  0000 -          daily    



   02/15/              



   2013              

 

 Melatonin  00/00/  Hx  Capsules  5mg  30cap  1 po qhs    Unknown



   0000 -        s      



   2013              

 

 Zyprexa  /  Hx  Solution  2.5    sublingual    Unknown



   0000 -    Rec          



   2013              

 

 Temazepam  00/00/  Hx  Capsules    30cap  1 po qhs    Unknown



   0000 -        s      



   2013              

 

 Venlafaxine HCL  00/  Hx  Tablets  150mg  90tab  1 po qd    Unknown



 ER  0000 -    ER 24HR    s      



   2014              

 

 Clonazepam  /00/  Hx  Tablets  1mg  20tab  1-3 tablets    Unknown



   0000 -        s   po prn    



   2013              

 

 Ibuprofen  /00/  Hx  Tablets  200mg  100ta  3 tabs prn    Unknown



   0000 -        bs      



   2016              

 

 Trazodone HCL  /  Hx  Tablets  50mg  30tab  1 tablet at    Unknown



   0000 -        s  bedtime as    



   2014              

 

 Stinging Nettle  /  Hx  Capsule      4 PO qd    Unknown



   0000 -              



   2014              

 

 Zyflammend  /  Hx        2 PO qd    Unknown



   0000 -              



   01/10/              



   2014              

 

 Tumeric  00/  Hx        4 PO qd    Unknown



   0000 -              



   2014              

 

 Omega 3  /  Hx  Capsules  1000mg  100ca  1 po qd.    Unknown



   0000 -        ps      



   2014              

 

 Magnesium  00/  Hx        daily    Unknown



   0000 -              



   2016              

 

 Effexor XR  /  Hx  Caps ER  75mg    tapering    Unknown



   0000 -    24HR      dose    



   2016              







Immunizations







 CPT Code  Status  Date  Vaccine  Lot #

 

 33755  Given  2007  Influenza Virus 3Yrs &amp; Over  [id number]

 

 94267  Given  Unknown  Tetanus And Diptheria (Td) For Adult Use  



       Preservative Free  







Vital Signs







 Date  Vital  Result  Comment

 

 2018  Height  67 inches  5'7"









 Weight  152.00 lb  

 

 Heart Rate  74 /min  

 

 BP Systolic  123 mmHg  

 

 BP Diastolic  82 mmHg  

 

 Body Temperature  98.1 F  

 

 O2 % BldC Oximetry  98 %  

 

 BMI (Body Mass Index)  23.8 kg/m2  









 2018  Height  66 inches  5'6"









 Weight  152.00 lb  

 

 Heart Rate  92 /min  

 

 BP Systolic Sitting  124 mmHg  

 

 BP Diastolic Sitting  78 mmHg  

 

 Respiratory Rate  14 /min  

 

 Body Temperature  98.2 F  

 

 BMI (Body Mass Index)  24.5 kg/m2  









 2018  Weight  150.25 lb  









 Heart Rate  100 /min  

 

 BP Systolic  140 mmHg  

 

 BP Diastolic  86 mmHg  

 

 Body Temperature  98.3 F  

 

 O2 % BldC Oximetry  99 %  









 2016  Weight  175.00 lb  









 Heart Rate  82 /min  

 

 BP Systolic Sitting  145 mmHg  

 

 BP Diastolic Sitting  101 mmHg  

 

 Body Temperature  98.7 F  









 2014  Height  66.5 inches  5'6.50"









 Weight  196.00 lb  

 

 Heart Rate  83 /min  

 

 BP Systolic Sitting  142 mmHg  

 

 BP Diastolic Sitting  86 mmHg  

 

 O2 % BldC Oximetry  98 %  

 

 BMI (Body Mass Index)  31.2 kg/m2  









 2014  Height  66.5 inches  5'6.50"









 Weight  193.00 lb  

 

 BP Systolic  130 mmHg  Ra large cuff

 

 BP Diastolic  86 mmHg  Ra large cuff

 

 BP Systolic Sitting  112 mmHg  LA large cuff

 

 BP Diastolic Sitting  82 mmHg  LA large cuff

 

 BP Systolic Standing  118 mmHg  LA

 

 BP Diastolic Standing  88 mmHg  LA

 

 Respiratory Rate  16 /min  

 

 BMI (Body Mass Index)  30.7 kg/m2  









 2014  Weight  193.75 lb  









 Heart Rate  86 /min  

 

 BP Systolic Sitting  141 mmHg  

 

 BP Diastolic Sitting  97 mmHg  









 01/10/2014  Heart Rate  76 /min  









 BP Systolic Sitting  130 mmHg  

 

 BP Diastolic Sitting  70 mmHg  

 

 Respiratory Rate  16 /min  









 2013  Heart Rate  80 /min  









 BP Systolic Sitting  150 mmHg  

 

 BP Diastolic Sitting  90 mmHg  

 

 Respiratory Rate  17 /min  









 10/17/2013  Weight  189.75 lb  









 Heart Rate  98 /min  

 

 BP Systolic Sitting  132 mmHg  

 

 BP Diastolic Sitting  90 mmHg  









 2013  Height  66.5 inches  5'6.50"









 Weight  193.50 lb  

 

 Heart Rate  88 /min  

 

 BP Systolic Sitting  124 mmHg  

 

 BP Diastolic Sitting  88 mmHg  

 

 BMI (Body Mass Index)  30.8 kg/m2  









 2013  Height  66.75 inches  5'6.75"









 Weight  193.50 lb  

 

 Heart Rate  96 /min  

 

 BP Systolic Sitting  136 mmHg  

 

 BP Diastolic Sitting  106 mmHg  

 

 BMI (Body Mass Index)  30.5 kg/m2  









 2013  Weight  191.00 lb  









 Heart Rate  105 /min  

 

 BP Systolic Sitting  125 mmHg  

 

 BP Diastolic Sitting  90 mmHg  









 2013  Height  66.5 inches  5'6.50"









 Weight  193.75 lb  

 

 Heart Rate  96 /min  

 

 BP Systolic Sitting  130 mmHg  

 

 BP Diastolic Sitting  90 mmHg  

 

 BMI (Body Mass Index)  30.8 kg/m2  









 02/15/2013  Height  66.5 inches  5'6.50"









 Weight  184.00 lb  

 

 Heart Rate  72 /min  

 

 BP Systolic Sitting  122 mmHg  

 

 BP Diastolic Sitting  70 mmHg  

 

 BMI (Body Mass Index)  29.3 kg/m2  









 2013  Height  66.5 inches  5'6.50"









 Weight  189.75 lb  

 

 Heart Rate  84 /min  

 

 BP Systolic Sitting  140 mmHg  

 

 BP Diastolic Sitting  88 mmHg  

 

 BMI (Body Mass Index)  30.2 kg/m2  









 2013  Height  66.5 inches  5'6.50"









 Weight  190.00 lb  

 

 Heart Rate  88 /min  

 

 BP Systolic Sitting  154 mmHg  

 

 BP Diastolic Sitting  102 mmHg  

 

 BMI (Body Mass Index)  30.2 kg/m2  









 2012  Height  66.50 inches  5'6.50"









 Weight  177.00 lb  

 

 Heart Rate  93 /min  

 

 BP Systolic Sitting  150 mmHg  

 

 BP Diastolic Sitting  100 mmHg  

 

 BMI (Body Mass Index)  28.1 kg/m2  









 2012  Height  66.50 inches  5'6.50"









 Weight  176.00 lb  

 

 Heart Rate  76 /min  

 

 BP Systolic Sitting  124 mmHg  

 

 BP Diastolic Sitting  88 mmHg  

 

 BMI (Body Mass Index)  28.0 kg/m2  









 10/11/2011  Height  66.50 inches  5'6.50"









 Weight  166.00 lb  

 

 Heart Rate  68 /min  

 

 BP Systolic Sitting  150 mmHg  

 

 BP Diastolic Sitting  90 mmHg  

 

 BMI (Body Mass Index)  26.4 kg/m2  









 2011  Heart Rate  80 /min  









 BP Systolic  124 mmHg  

 

 BP Diastolic  90 mmHg  









 2011  Weight  169.00 lb  









 Heart Rate  62 /min  

 

 BP Systolic Sitting  112 mmHg  

 

 BP Diastolic Sitting  70 mmHg  

 

 Body Temperature  101.5 F  lt ear









 2011  Height  66 inches  5'6"









 Weight  167.00 lb  

 

 Heart Rate  86 /min  

 

 BP Systolic  120 mmHg  

 

 BP Diastolic  70 mmHg  

 

 BP Systolic Sitting  122 mmHg  

 

 BP Diastolic Sitting  80 mmHg  

 

 BP Systolic Standing  120 mmHg  

 

 BP Diastolic Standing  80 mmHg  

 

 Respiratory Rate  16 /min  

 

 BMI (Body Mass Index)  27.0 kg/m2  









 2011  Height  66.25 inches  5'6.25"









 Weight  167.00 lb  

 

 Heart Rate  84 /min  

 

 BP Systolic Sitting  130 mmHg  

 

 BP Diastolic Sitting  86 mmHg  

 

 BMI (Body Mass Index)  26.7 kg/m2  









 07/15/2010  Height  66.25 inches  5'6.25"









 Weight  163.00 lb  

 

 Heart Rate  88 /min  

 

 BP Systolic Sitting  142 mmHg  

 

 BP Diastolic Sitting  94 mmHg  

 

 BMI (Body Mass Index)  26.1 kg/m2  









 2010  Height  66.25 inches  5'6.25"









 Weight  161.00 lb  

 

 Heart Rate  92 /min  

 

 BP Systolic Sitting  130 mmHg  

 

 BP Diastolic Sitting  80 mmHg  

 

 BMI (Body Mass Index)  25.8 kg/m2  









 2010  Height  66.25 inches  5'6.25"









 Weight  161.75 lb  

 

 Heart Rate  76 /min  

 

 BP Systolic  118 mmHg  

 

 BP Diastolic  92 mmHg  

 

 Respiratory Rate  16 /min  

 

 Body Temperature  98.7 F  

 

 BMI (Body Mass Index)  25.9 kg/m2  









 2010  Height  66.25 inches  5'6.25"









 Weight  156.00 lb  

 

 Heart Rate  80 /min  

 

 BP Systolic  114 mmHg  

 

 BP Diastolic  84 mmHg  

 

 BMI (Body Mass Index)  25.0 kg/m2  









 2010  Height  66.25 inches  5'6.25"









 Weight  158.00 lb  

 

 Heart Rate  60 /min  

 

 BP Systolic Sitting  120 mmHg  

 

 BP Diastolic Sitting  70 mmHg  

 

 BMI (Body Mass Index)  25.3 kg/m2  









 2010  Height  66.50 inches  5'6.50"









 Weight  156.50 lb  

 

 Heart Rate  84 /min  

 

 BP Systolic Sitting  124 mmHg  

 

 BP Diastolic Sitting  92 mmHg  

 

 BMI (Body Mass Index)  24.9 kg/m2  









 2010  Height  66.50 inches  5'6.50"









 Weight  160.50 lb  

 

 Heart Rate  96 /min  

 

 BP Systolic Sitting  130 mmHg  

 

 BP Diastolic Sitting  82 mmHg  

 

 BMI (Body Mass Index)  25.5 kg/m2  









 2009  Height  66.50 inches  5'6.50"









 Weight  161.00 lb  

 

 Heart Rate  84 /min  

 

 BP Systolic Sitting  116 mmHg  

 

 BP Diastolic Sitting  84 mmHg  

 

 BMI (Body Mass Index)  25.6 kg/m2  









 2009  Height  66.50 inches  5'6.50"









 Weight  162.00 lb  

 

 Heart Rate  84 /min  

 

 BP Systolic Sitting  134 mmHg  

 

 BP Diastolic Sitting  92 mmHg  

 

 BMI (Body Mass Index)  25.8 kg/m2  









 2009  Height  66.50 inches  5'6.50"









 Weight  161.00 lb  

 

 Heart Rate  88 /min  

 

 BP Systolic Sitting  112 mmHg  

 

 BP Diastolic Sitting  80 mmHg  

 

 BMI (Body Mass Index)  25.6 kg/m2  









 2009  Height  66.50 inches  5'6.50"









 Weight  167.00 lb  

 

 Heart Rate  76 /min  

 

 BP Systolic Sitting  122 mmHg  

 

 BP Diastolic Sitting  86 mmHg  

 

 BMI (Body Mass Index)  26.5 kg/m2  









 2009  Weight  161.00 lb  









 Heart Rate  90 /min  

 

 BP Systolic Sitting  112 mmHg  

 

 BP Diastolic Sitting  62 mmHg  









 2009  Height  67 inches  5'7"









 Weight  156.00 lb  

 

 Heart Rate  64 /min  

 

 BP Systolic Sitting  118 mmHg  

 

 BP Diastolic Sitting  76 mmHg  

 

 BMI (Body Mass Index)  24.4 kg/m2  









 2009  Weight  151.00 lb  









 Heart Rate  70 /min  

 

 BP Systolic Sitting  130 mmHg  

 

 BP Diastolic Sitting  70 mmHg  

 

 Respiratory Rate  16 /min  









 2008  Height  67 inches  5'7"









 Weight  142.00 lb  

 

 Heart Rate  86 /min  

 

 BP Systolic Sitting  124 mmHg  

 

 BP Diastolic Sitting  82 mmHg  

 

 BMI (Body Mass Index)  22.2 kg/m2  









 10/22/2008  Height  67 inches  5'7"









 Weight  140.00 lb  

 

 Heart Rate  60 /min  

 

 BP Systolic Sitting  116 mmHg  

 

 BP Diastolic Sitting  60 mmHg  

 

 BMI (Body Mass Index)  21.9 kg/m2  









 2008  Height  67 inches  5'7"









 Weight  154.00 lb  

 

 Heart Rate  68 /min  

 

 BP Systolic Sitting  124 mmHg  

 

 BP Diastolic Sitting  82 mmHg  

 

 BMI (Body Mass Index)  24.1 kg/m2  









 07/15/2008  Height  67 inches  5'7"









 Weight  155.00 lb  

 

 Heart Rate  76 /min  

 

 BP Systolic Sitting  114 mmHg  

 

 BP Diastolic Sitting  76 mmHg  

 

 BMI (Body Mass Index)  24.3 kg/m2  









 2008  Height  67 inches  5'7"









 Weight  157.00 lb  

 

 Heart Rate  62 /min  

 

 BP Systolic Sitting  116 mmHg  

 

 BP Diastolic Sitting  60 mmHg  

 

 BMI (Body Mass Index)  24.6 kg/m2  









 2008  Height  67 inches  5'7"









 Weight  163.00 lb  

 

 Heart Rate  76 /min  

 

 BP Systolic Sitting  112 mmHg  

 

 BP Diastolic Sitting  62 mmHg  

 

 BMI (Body Mass Index)  25.5 kg/m2  









 2007  Height  67 inches  5'7"









 Weight  167.00 lb  

 

 Heart Rate  80 /min  

 

 BP Systolic Sitting  112 mmHg  

 

 BP Diastolic Sitting  80 mmHg  

 

 Body Temperature  98.3 F  

 

 BMI (Body Mass Index)  26.2 kg/m2  









 2007  Height  67 inches  5'7"









 Weight  150.00 lb  

 

 Heart Rate  88 /min  

 

 BP Systolic Sitting  115 mmHg  

 

 BP Diastolic Sitting  70 mmHg  

 

 BMI (Body Mass Index)  23.5 kg/m2  







Results







 Test  Date  Test  Result  H/L  Range  Note

 

 Laboratory test finding  2016  Monospot  Negative    Negative  1

 

 CBC Auto Diff  2016  White Blood Count  4.8 10^3/uL    3.5-10.8  









 Red Blood Count  4.77 10^6/uL    4.0-5.4  

 

 Hemoglobin  13.3 g/dL    12.0-16.0  

 

 Hematocrit  40 %    35-47  

 

 Mean Corpuscular Volume  83 fL    80-97  

 

 Mean Corpuscular Hemoglobin  28 pg    27-31  

 

 Mean Corpuscular HGB Conc  34 g/dL    31-36  

 

 Red Cell Distribution Width  13 %    10.5-15  

 

 Platelet Count  292 10^3/uL    150-450  

 

 Mean Platelet Volume  9 um3    7.4-10.4  

 

 Abs Neutrophils  2.5 10^3/uL    1.5-7.7  

 

 Abs Lymphocytes  1.8 10^3/uL    1.0-4.8  

 

 Abs Monocytes  0.4 10^3/uL    0-0.8  

 

 Abs Eosinophils  0 10^3/uL    0-0.6  

 

 Abs Basophils  0 10^3/uL    0-0.2  

 

 Abs Nucleated RBC  0 10^3/uL      

 

 Granulocyte %  53.3 %    38-83  

 

 Lymphocyte %  36.8 %    25-47  

 

 Monocyte %  8.6 %    1-9  

 

 Eosinophil %  0.4 %    0-6  

 

 Basophil %  0.9 %    0-2  

 

 Nucleated Red Blood Cells %  0.1      









 Laboratory test finding  2016  Erythrocyte Sed Rate  14 mm/Hr    0-14  2









 CRP High Sensitivity  1.99 mg/L      3









 Comp Metabolic Panel  2016  Sodium  135 mmol/L    133-145  









 Potassium  4.2 mmol/L    3.5-5.0  

 

 Chloride  100 mmol/L  Low  101-111  

 

 Co2 Carbon Dioxide  28 mmol/L    22-32  

 

 Anion Gap  7 mmol/L    2-11  

 

 Glucose  91 mg/dL      

 

 Blood Urea Nitrogen  8 mg/dL    6-24  

 

 Creatinine  0.79 mg/dL    0.51-0.95  

 

 BUN/Creatinine Ratio  10.1    8-20  

 

 Calcium  9.3 mg/dL    8.6-10.3  

 

 Total Protein  7.3 g/dL    6.4-8.9  

 

 Albumin  4.5 g/dL    3.2-5.2  

 

 Globulin  2.8 g/dL    2-4  

 

 Albumin/Globulin Ratio  1.6    1-3  

 

 Total Bilirubin  0.40 mg/dL    0.2-1.0  

 

 Alkaline Phosphatase  53 U/L      

 

 Alt  12 U/L    7-52  

 

 Ast  16 U/L    13-39  

 

 Egfr Non-  78.3    &gt;60  

 

 Egfr   100.8    &gt;60  4









 Laboratory test finding  2016  TSH (Thyroid Stim Horm)  2.42 ?IU/mL    
0.34-5.60  









 Lyme Disease Serology  Negative    Negative  5









 Urinalysis Profile  2016  Urine White Blood Cell  Trace(0-5/hpf)    
Absent  









 Urine Red Blood Cell  Trace(0-2/hpf)    Absent  

 

 Urine Bacteria  Absent    Absent  

 

 Urine Squamous Epithelial Cell  Present    Absent  

 

 Urine Color  Yellow      

 

 Urine Appearance  Clear      

 

 Urine Specific Gravity  1.006  Low  1.010-1.030  

 

 Urine pH  7.0    5-9  

 

 Urine Urobilinogen  Negative    Negative  

 

 Urine Ketones  Negative    Negative  

 

 Urine Protein  Negative    Negative  

 

 Urine Leukocytes  Negative    Negative  

 

 Urine Blood  2+    Negative  

 

 Urine Nitrite  Negative    Negative  

 

 Urine Bilirubin  Negative    Negative  

 

 Urine Glucose  Negative    Negative  









 Vitamin D, 25 Hydroxy  10/17/2013  25-Hydroxy Vitamin D2  8.8 ng/mL      









 25-Hydroxy Vitamin D3  21 ng/mL      

 

 25-Hydroxy Vitamin D Total  30 ng/mL      6









 Vitamin B12 And Folate Serum  10/17/2013  Vitamin B12  527 pg/mL    180-914  









 Folate  9.1 ng/mL    2-16  









 CBC With Manual Diff  10/17/2013  White Blood Count  7.2 10^3/uL    4.8-10.8  









 Red Blood Count  4.60 10^6/uL    4.0-5.4  

 

 Hemoglobin  13.5 g/dL    12.0-16.0  

 

 Hematocrit  39 %    35-47  

 

 Mean Corpuscular Volume  85 fL    80-97  

 

 Mean Corpuscular Hemoglobin  29 pg    27-31  

 

 Mean Corpuscular HGB Conc  34 g/dL    31-36  

 

 Red Cell Distribution Width  14 %    10.5-15  

 

 Platelet Count  294 10^3/uL    150-450  

 

 Mean Platelet Volume  9 um3    7.4-10.4  

 

 Abs Neutrophils  4.7 10^3/uL    1.5-7.7  

 

 Abs Lymphocytes  2.0 10^3/uL    1.0-4.8  

 

 Abs Monocytes  0.5 10^3/uL    0-0.8  

 

 Abs Eosinophils  0 10^3/uL    0-0.6  

 

 Abs Basophils  0 10^3/uL    0-0.2  

 

 Abs Nucleated RBC  0 10^3/uL      

 

 Neutrophil %  56 %    38-83  

 

 Band %  2 %    0-8  

 

 Lymphocytes %  30 %    25-47  

 

 Monocytes %  9 %    0-13  

 

 Eosinophils %  3 %    0-6  

 

 RBC Morphology  Normal    Normal  









 Basic Metabolic Panel  10/17/2013  Sodium  134 mmol/L    133-145  









 Potassium  4.5 mmol/L    3.5-5.0  

 

 Chloride  103 mmol/L    101-111  

 

 Co2 Carbon Dioxide  26.0 mmol/L    22-32  

 

 Anion Gap  5.0 mmol/L    2-11  

 

 Glucose  108 mg/dL  High    

 

 Blood Urea Nitrogen  11 mg/dL    6-24  

 

 Creatinine  0.80 mg/dL    0.50-1.40  

 

 BUN/Creatinine Ratio  13.8    8-20  

 

 Calcium  8.9 mg/dL    8.1-9.9  

 

 Egfr Non-  78.3    &gt;60  

 

 Egfr   100.7    &gt;60  7









 Liver Function Panel  2013  Total Protein  6.4 g/dL    6.2-8.1  









 Albumin  3.8 g/dL    3.6-5.4  

 

 Globulin  2.6 g/dL    2-4  

 

 Albumin/Globulin Ratio  1.5    1-3  

 

 Total Bilirubin  0.5 mg/dL    0.4-1.5  

 

 Direct Bilirubin  0.1 mg/dL    0.1-0.5  

 

 Indirect Bilirubin  0.4 mg/dL    0.3-1.0  

 

 Alkaline Phosphatase  73 U/L      

 

 Alt  18 U/L    14-54  

 

 Ast  19 U/L    12-42  









 Laboratory test finding  2013  Egg White Allergen IgE  &lt;0.35 kU/L    
  8









 Beef Allergen IgE  &lt;0.35 kU/L      9

 

 Chicken Meat Allergen IgE  &lt;0.35 kU/L      10

 

 Chocolate Allergen IgE  &lt;0.35 kU/L      11

 

 Corn Allergen IgE  &lt;0.35 kU/L      12

 

 Cow's Milk Allergen IgE  &lt;0.35 kU/L      13

 

 Orange Allergen IgE  &lt;0.35 kU/L      14

 

 Peanut Allergen IgE  &lt;0.35 kU/L      15

 

 Rice Allergen IgE  &lt;0.35 kU/L      16

 

 Soybean Allergen IgE  &lt;0.35 kU/L      17

 

 Tomato Allergen IgE  &lt;0.35 kU/L      18

 

 Wheat Allergen IgE  &lt;0.35 kU/L      19









 Lipid Profile (Trig/Chol/HDL)  2013  Triglycerides  90 mg/dL      









 Cholesterol  180 mg/dL    Less than 200  

 

 HDL Cholesterol  52 mg/dL    40-60  20

 

 Cholesterol/HDL Ratio  3.5 Average    1-4.44  

 

 LDL Cholesterol  110.0  High  Less Than 100  21









 Laboratory test finding  2013  Glucose  99 mg/dL      22

 

 Laboratory test finding  2013  Free T4  0.76 ng/mL    0.61-1.24  









 T4  5.6 g/mL    5.0-12.0  

 

 Total T3  0.62 ng/mL    0.5-1.7  









 CBC Auto Diff  2013  White Blood Count  5.5 10^3/uL    4.8-10.8  









 Red Blood Count  4.28 10^6/uL    4.0-5.4  

 

 Hemoglobin  12.2 g/dL    12.0-16.0  

 

 Hematocrit  35 %    35-47  

 

 Mean Corpuscular Volume  82 fL    80-97  

 

 Mean Corpuscular Hemoglobin  29 pg    27-31  

 

 Mean Corpuscular HGB Conc  35 g/dL    31-36  

 

 Red Cell Distribution Width  13 %    10.5-15  

 

 Platelet Count  259 10^3/uL    150-450  

 

 Mean Platelet Volume  9 um3    7.4-10.4  

 

 Abs Neutrophils  3.1 10^3/uL    1.5-7.7  

 

 Abs Lymphocytes  1.8 10^3/uL    1.0-4.8  

 

 Abs Monocytes  0.5 10^3/uL    0-0.8  

 

 Abs Eosinophils  0 10^3/uL    0-0.6  

 

 Abs Basophils  0 10^3/uL    0-0.2  

 

 Abs Nucleated RBC  0 10^3/uL      

 

 Granulocyte %  56.7 %    38-83  

 

 Lymphocyte %  32.7 %    25-47  

 

 Monocyte %  9.9 %  High  1-9  

 

 Eosinophil %  0.2 %    0-6  

 

 Basophil %  0.5 %    0-2  

 

 Nucleated Red Blood Cells %  0      









 Comp Metabolic Panel  2013  Sodium  133 mmol/L    133-145  









 Potassium  4.5 mmol/L    3.5-5.0  

 

 Chloride  103 mmol/L    101-111  

 

 Co2 Carbon Dioxide  26.0 mmol/L    22-32  

 

 Anion Gap  4.0 mmol/L    2-11  

 

 Glucose  105 mg/dL  High    

 

 Blood Urea Nitrogen  11 mg/dL    6-24  

 

 Creatinine  0.70 mg/dL    0.50-1.40  

 

 BUN/Creatinine Ratio  15.7    8-20  

 

 Calcium  9.5 mg/dL    8.1-9.9  

 

 Total Protein  6.0 g/dL  Low  6.2-8.1  

 

 Albumin  3.8 g/dL    3.6-5.4  

 

 Globulin  2.2 g/dL    2-4  

 

 Albumin/Globulin Ratio  1.7    1-3  

 

 Total Bilirubin  0.4 mg/dL    0.4-1.5  

 

 Alkaline Phosphatase  66 U/L      

 

 Alt  16 U/L    14-54  

 

 Ast  20 U/L    12-42  

 

 Egfr Non-  91.8    &gt;60  

 

 Egfr   118.0    &gt;60  23









 Laboratory test finding  2013  C Reactive Protein  0.6 mg/dL  High  Less 
than 0.5  









 Erythrocyte Sed Rate  17 mm/Hr  High  0-14  









 Vitamin D, 25 Hydroxy  2013  25-Hydroxy Vitamin D2  &lt;4.0 ng/mL      









 25-Hydroxy Vitamin D3  28 ng/mL      

 

 25-Hydroxy Vitamin D Total  28 ng/mL      24









 Laboratory test finding  2013  TSH (Thyroid  1.61 miu/mL    0.34-5.60  



     Stimulating Horm)        

 

 Vitamin D, 25 Hydroxy  2012  25-Hydroxy Vitamin D2  &lt;4.0 ng/mL    ()  









 25-Hydroxy Vitamin D3  18 ng/mL    ()  

 

 25-Hydroxy Vitamin D Total  18 ng/mL    ()  25









 Lead  2012  Lead  &lt; 1.0 g/dL    0-25.0  26









 Lead Specimen Type  VENOUS      









 Laboratory test finding  2012  [place],Whole Blood  &lt;1 ng/mL    0-9  
27

 

 Laboratory test finding  2011  Erythrocyte Sed Rate  8 MM/HR    0-15  









 C Reactive Protein  &lt; 0.5 mg/dL    Less Than 0.5  

 

 Rheumatoid Factor  &lt; 15 IU/mL    &lt;15  28

 

 Lyme Disease Serology  Negative    Negative  29









 Trish (Antinuclear Antibodies)  2011  Antinuclear AB  NEGATIVE    Negative
  

 

 CBC Auto Diff  2011  White Blood Count  6.5 CUMM    4.8-10.8  









 Red Cell Count  4.24 CUMM    4.2-5.4  

 

 Hemoglobin  12.0 g/dL    12.0-16.0  

 

 Hematocrit  35 %    35-47  

 

 Mean Corpuscular Volume  84 um3    79-97  

 

 Mean Corpuscular Hemoglob  28 pg    27-31  

 

 Mean Corpuscular HGB Cone  34 g/dL    32-36  

 

 Redcell Distribution WDTH  13 %    10.5-15  

 

 Platelet Count  290 CUMM    150-450  

 

 Mean Platelet Volume  8.4 um3    7.4-10.4  

 

 Gran %  61.8 %    38-83  

 

 Lymph %  29.2 %    25-47  

 

 Mononuclear %  8.4 %    1-9  

 

 Eosinophil %  0 %    0-6  

 

 Basophil %  0.6 %    0-2  

 

 Abs Lymphs  1.9    1.0-4.8  

 

 Abs Mononuclear  0.5    0-0.8  

 

 Absolute Neutrophil Count  4.0    1.5-7.7  

 

 Abs Eosinophils  0    0-0.6  

 

 Abs Basophils  0    0-0.2  









 Lipid Profile (Trig/Chol/HDL)  2011  Triglyceride  106 mg/dL      









 Cholesterol  202 mg/dL  High  Less Than 200  30

 

 High Density Lipoprotein  55 mg/dL    40-60  31

 

 Cholesterol/HDL Ratio  3.67 AVERAGE    1-4.44  

 

 Low Density Lipoprotein  126 mg/dL  High  Less Than 100  32









 Comp Metabolic Panel  2011  Sodium  138 mmol/L    135-145  









 Potassium  4.0 mmol/L    3.5-5.0  

 

 Chloride  105 mmol/L    101-111  

 

 Co2 (Carbon Dioxide)  26.0 mmol/L    22-32  

 

 Anion Gap  7.0 mmol/L    2-11  33

 

 Glucose  73 mg/dL      

 

 BUN  10 mg/dL    6-24  

 

 Creatinine  0.7 mg/dL    0.50-1.40  

 

 One Over Creatinine  1.42      

 

 BUN/Creatinine Ratio  14.3    8-20  

 

 Calcium  9.4 mg/dL    8.1-9.9  

 

 Total Protein  6.4 GM/DL    6.2-8.1  

 

 Albumin  4.2 GM/DL    3.6-5.4  

 

 Globulin  2.2 GM/DL    2-4  

 

 Albumin/Globulin Ratio  1.9    1-3  

 

 Bilirubin Total  0.4 mg/dL    0.4-1.5  34

 

 Alkaline Phosphatase  79 U/L      

 

 Alt (SGPT)  17 U/L    14-54  

 

 Ast (Sgot)  23 U/L    12-42  

 

 eGFR Non-  92.2    &gt; 60  

 

 eGFR   118.6    &gt; 60  35









 DR Olson's Lab Panel  2011  TSH  1.64 MIU/ML    0.34-5.60  

 

 Vad  10/11/2011  Vad Final  NONREACTIVE    Nonreactive  36

 

 Laboratory test  2010  Vitamin D, 1,25  29 pg/mL    18-78  37



 finding    Dihydroxy        

 

 Vitamin D, 25 Hydroxy  2010  25-Hydroxy Vitamin D2  &lt;4.0 ng/mL    ()  









 25-Hydroxy Vitamin D3  26 ng/mL    ()  

 

 25-Hydroxy Vitamin D Total  26 ng/mL    ()  38









 Vitamin D, 25 Hydroxy  2010  25-Hydroxy Vitamin D2  &lt;4.0 ng/mL    ()  









 25-Hydroxy Vitamin D3  27 ng/mL    ()  

 

 25-Hydroxy Vitamin D Total  27 ng/mL    ()  39









 Laboratory test finding  2010  Cortisol  14.3 g/dL      40









 Dhea Sulfate  62.1 g/dL      41

 

 Dhea  2.6 ng/mL    &lt;10  42

 

 Vitamin B12  703 pg/mL    180-914  

 

 Glucose  105 mg/dL  High    43









 Laboratory test finding  2009  Erythrocyte Sed Rate  10 MM/HR    0-15  









 Lyme Disease Serology  Negative    Negative  44

 

 Rheumatoid Factor  &lt; 20.0 IU/mL    Less Than 20  

 

 Anti-Nuclear Antibody  &lt;0.1 U    &lt;=1.0  45









 Laboratory test finding  2009  CRP High Sensitivity MML  1.4 mg/L    &lt;
=3.0  46

 

 Laboratory test finding  2009  [facility] [place] Panel  (SEE NOTE)      47









 [facility] Peanut  (SEE NOTE)      48

 

 [facility] Walnuts  (SEE NOTE)      49









 Laboratory test finding  2009  [facility] [place] Panel  (SEE NOTE)      50









 [facility] Peanut  (SEE NOTE)      51

 

 [facility] Walnuts  (SEE NOTE)      52

 

 [employer]  (SEE NOTE)      53









 CBC With Manual Diff  2008  White Blood Count  8.1 CUMM    4.8-10.8  









 Red Cell Count  4.45 CUMM    4.2-5.4  

 

 Hemoglobin  13.1 g/dL    12.0-16.0  

 

 Hematocrit  37 %    35-47  

 

 Mean Corpuscular Volume  84 um3    79-97  

 

 Mean Corpuscular Hemoglob  29 pg    27-31  

 

 Mean Corpuscular HGB Cone  35 g/dL    32-36  

 

 Redcell Distribution WDTH  13 %    10.5-15  

 

 Platelet Count  248 CUMM    150-450  

 

 Mean Platelet Volume  8.4 um3    7.4-10.4  

 

 Polysegmented Neutrophil  82 %    38-83  

 

 Band Neutrophil  3 %    0-8  

 

 Lymphocyte  9 %  Low  25-47  

 

 Monocyte  4 %    0-13  

 

 Eosenophil  1 %    0-6  

 

 Atypical Lymph  1 %    0-6  

 

 Absolute Neutrophil Count  6.8      

 

 Anisocytosis  SLIGHT      









 Protime  2008  Protime  12.3    10.9-13.3  









 Inr  1.03      54









 Laboratory test finding  2008  Rheumatoid Factor  &lt; 20.0 IU/mL    
Less Than 20  









 Lyme Disease Serology  Negative    Negative  55

 

 Erythrocyte Sed Rate  11 MM/HR    0-15  

 

 C Reactive Protein  0.6 mg/dL  High  Less Than 0.5  

 

 Anti Dna (Double Stranded Dna)  NEGATIVE    Negative  









 FSH And LH  2008  FSH  1.98 MIU/ML      56









 Lutenizing Hormone  1.34 MIU/ML      57









 Laboratory test finding  2008  Estradiol  98 pg/mL      58

 

 Basic Metabolic Panel  2008  Sodium  136 mmol/L    135-145  









 Potassium  4.3 mmol/L    3.5-5.0  

 

 Chloride  104 mmol/L    101-111  

 

 Co2 (Carbon Dioxide)  27.0 mmol/L    22-32  

 

 Anion Gap  5.0 mmol/L    2-11  59

 

 Glucose  100 mg/dL      60

 

 BUN  15 mg/dL    6-24  

 

 Creatinine  0.66 mg/dL    0.50-1.40  

 

 One Over Creatinine  1.50      

 

 BUN/Creatinine Ratio  22.7  High  8-20  

 

 Calcium  9.0 mg/dL    8.1-9.9  61









 Lipid Profile (Trig/Chol/HDL)  2008  Triglyceride  99 mg/dL      62









 Cholesterol  165 mg/dL    Less Than 200  62, 63

 

 High Density Lipoprotein  44 mg/dL    40-60  62, 64

 

 Cholesterol/HDL Ratio  3.75 AVERAGE    1-4.44  62

 

 Low Density Lipoprotein  101 mg/dL  High  Less Than 100  62, 65









 1  Would you like an EBV if Monospot is Negative?: N

 

 2  Would you like an EBV if Monospot is Negative?: N

 

 3  Low risk: &lt;1.00



   Average risk: 1.00-3.00



   High risk: &gt;3.00

 

 4  *******Because ethnic data is not always readily available,



   this report includes an eGFR for both -Americans and



   non- Americans.****



   The National Kidney Disease Education Program (NKDEP) does



   not endorse the use of the MDRD equation for patients that



   are not between the ages of 18 and 70, are pregnant, have



   extremes of body size, muscle mass, or nutritional status,



   or are non- or non-.



   According to the National Kid[employer] Foundation, irrespective of



   diagnosis, the stage of the disease is based on the level of



   kidney function:



   Stage Description                      GFR(mL/min/1.73 m(2))



   1     Kidney damage with normal or decreased GFR       90



   2     Kidney damage with mild decrease in GFR          60-89



   3     Moderate decrease in GFR                         30-59



   4     Severe decrease in GFR                           15-29



   5     Kidney failure                       &lt;15 (or dialysis)

 

 5  Serologic response to B. burgdorferi infection is not



   detected, but cannot rule out early infection during



   which low or undetectable antibody levels to



   B. burgdorferi may be present. If clinically indicated,



   a new serum specimen should be submitted in 7-14 days.



   Test Performed by:



   [place] [id number]



   : Celso Mix II, M.D., Ph.D.

 

 6  -- REFERENCE VALUE --



   25-HYDROXY D TOTAL (D2+D3) Optimum levels in the healthy



   population are 20-50, patients with bone disease may



   benefit from higher levels within this range.



   Test Performed by:



   [place] [id number]



   : Dilan Barrios III, M.D.

 

 7  *******Because ethnic data is not always readily available,



   this report includes an eGFR for both -Americans and



   non- Americans.****



   The National Kidney Disease Education Program (NKDEP) does



   not endorse the use of the MDRD equation for patients that



   are not between the ages of 18 and 70, are pregnant, have



   extremes of body size, muscle mass, or nutritional status,



   or are non- or non-.



   According to the [employer], irrespective of



   diagnosis, the stage of the disease is based on the level of



   kidney function:



   Stage Description                      GFR(mL/min/1.73 m(2))



   1     Kidney damage with normal or decreased GFR       90



   2     Kidney damage with mild decrease in GFR          60-89



   3     Moderate decrease in GFR                         30-59



   4     Severe decrease in GFR                           15-29



   5     Kidney failure                       &lt;15 (or dialysis)

 

 8  Class 0 (Negative &lt;0.35)



   Test Performed by:



   [place] [id number]



   : Dialn Barrios III, M.D.

 

 9  Class 0 (Negative &lt;0.35)



   Test Performed by:



   [place] [id number]



   : Dilan Barrios III, M.D.

 

 10  Class 0 (Negative &lt;0.35)



   Test Performed by:



   [place] [id number]



   : Dilan Barrios III, M.D.

 

 11  Class 0 (Negative &lt;0.35)



   Test Performed by:



   [place] [id number]



   : Dilan Barrios III, M.D.

 

 12  Class 0 (Negative &lt;0.35)



   Test Performed by:



   [place] [id number]



   : Dilan Barrios III, M.D.

 

 13  Class 0 (Negative &lt;0.35)



   Test Performed by:



   [place] [id number]



   : Dilan Barrios III, M.D.

 

 14  Class 0 (Negative &lt;0.35)



   Test Performed by:



   [place] [id number]



   : Dilan Barrios III, M.D.

 

 15  Class 0 (Negative &lt;0.35)



   Test Performed by:



   [place] [id number]



   : Dilan Barrios III, M.D.

 

 16  Class 0 (Negative &lt;0.35)



   Test Performed by:



   [place] [id number]



   : Dilan Barrios III, M.D.

 

 17  Class 0 (Negative &lt;0.35)



   Test Performed by:



   [place] [id number]



   : Dilan Barrios III, M.D.

 

 18  Class 0 (Negative &lt;0.35)



   Test Performed by:



   [place] [id number]



   : Dilan Barrios III, M.D.

 

 19  Class 0 (Negative &lt;0.35)



   Test Performed by:



   [place] [id number]



   : Dilan Barrios III, M.D.

 

 20  HDL Interpretation:



   Undesirable: High Risk:  Less than 40 mg/dL



   Desirable:  Low Risk:  Greater than 60 mg/dL

 

 21  LDL Interpretation:



   Low Risk Optimal Level:  LDL Less than 100 mg/dL



   Near or Above Optimal:  -129 mg/dL



   Borderline High Risk:  -159 mg/dL



   High Risk:  -189 mg/dL



   Very High Risk:  LDL Greater than 189 mg/dL

 

 22  FASTING

 

 23  *******Because ethnic data is not always readily available,



   this report includes an eGFR for both -Americans and



   non- Americans.****



   The National Kidney Disease Education Program (NKDEP) does



   not endorse the use of the MDRD equation for patients that



   are not between the ages of 18 and 70, are pregnant, have



   extremes of body size, muscle mass, or nutritional status,



   or are non- or non-.



   According to the National Kidney Foundation, irrespective of



   diagnosis, the stage of the disease is based on the level of



   kidney function:



   Stage Description                      GFR(mL/min/1.73 m(2))



   1     Kidney damage with normal or decreased GFR       90



   2     Kidney damage with mild decrease in GFR          60-89



   3     Moderate decrease in GFR                         30-59



   4     Severe decrease in GFR                           15-29



   5     Kidney failure                       &lt;15 (or dialysis)

 

 24  -- REFERENCE VALUE --



   25-HYDROXY D TOTAL (D2+D3)



   Optimum levels in the normal population are 25-80



   Test Performed by:



   [place] [address]



   : Dilan Barrios III, M.D.

 

 25  Interpretation: 10-24 (mild to moderate deficiency)



   



   -- REFERENCE VALUE --



   25-HYDROXY D TOTAL (D2+D3)



   Optimum levels in the normal population are 25-80



   



   Test Performed by:



   [facility] Dpt of Lab Med and Pathology



   [address] [place] [address]



   : Dilan Barrios III, M.D.

 

 26  CDC CLASSIFICATIONS FOR BLOOD LEAD CONCENTRATION SCREENING



   IN CHILDREN:



   CDC CLASS*           BLOOD LEAD CONCENTRATION (MCG/DL)



   I                     LESS THAN OR EQUAL TO 9



   IIA                    10 - 14



   IIB                    15 - 19



   III                    20 - 44



   IV                    45 - 69



   V                     GREATER THAN OR EQUAL TO 70



   *REFER TO CURRENT CDC GUIDELINES FOR COMMENTS AND



   INTERVENTIONS RECOMMENDED FOR EACH CLASS.



   



   *******CERTIFICATE OF BLOOD LEAD TESTING*******



   THIS IS TO CERTIFY THAT THE ABOVE NAMED PATIENT HAS



   BEEN TESTED FOR BLOOD LEAD.  TESTING WAS PERFORMED BY



   [facility] WHICH IS



   LICENSED BY [facility] TO PERFORM BLOOD LEAD



   TESTING.



   



   THIS CERTIFICATE IS PROVIDED AS A SERVICE TO OUR



   CLIENTS AND THEIR PATIENTS WHO MAY BE REQUIRED TO



   PRODUCE DOCUMENTATION OF BLOOD LEAD TESTING.



   .

 

 27  Test Performed by:



   [facility] Dpt of Lab Med and Pathology



   [address] [place] [address]



   : Dilan Barrios III, M.D.

 

 28  Test Performed by:



   [facility] Dpt [facility] Med and Pathology



   [address] [place] [address]



   : Dilan Barrios III, M.D.

 

 29  Serologic response to B. burgdorferi infection is not



   detected, but cannot rule out early infection during



   which low or undetectable antibody levels to



   B. burgdorferi may be present. If clinically indicated,



   a new serum specimen should be submitted in 7-14 days.



   



   Test Performed by:



   [facility] Dpt of Lab Med and Pathology



   [address] [place] [address]



   : Dilan Barrios III, M.D.

 

 30  CHOLESTEROL INTERPRETATION:



   Desirable:  Less than 200 MG/DL



   Borderline-High Risk:  200-239 MG/DL



   High-Risk:  240 MG/DL and over

 

 31  HDL INTERPRETATION:



   Undesirable: High Risk:  Less than 40 MG/DL



   Desirable:  Low Risk:  Greater than 60 MG/DL

 

 32  LDL INTERPRETATION:



   Low Risk Optimal Level:  LDL Less than 100 MG/DL



   Near or Above Optimal:  -129 MG/DL



   Borderline High Risk:  -159 MG/DL



   High Risk:  -189 MG/DL



   Very High Risk:  LDL Greater than 189 MG/DL

 

 33  Anion gap measurement may be of limited value in the



   presence of any alkalosis, especially in a combined acid



   base disorder.



   .

 

 34  A metabolite of Naproxen, O-desmethylnaproxen, has been



   shown to interfere with the Jendrassik-Lesa method for



   measuring total bilirubin.  Samples from patients who have



   taken Naproxen have shown spurious elevation in total



   bilirubin levels.

 

 35  *******Because ethnic data is not always readily available,



   this report includes an eGFR for both -Americans and



   non- Americans.****



   The National Kidney Disease Education Program (NKDEP) does



   not endorse the use of the MDRD equation for patients that



   are not between the ages of 18 and 70, are pregnant, have



   extremes of body size, muscle mass, or nutritional status,



   or are non- or non-.



   According to the National Kidney Foundation, irrespective of



   diagnosis, the stage of the disease is based on the level of



   kidney function:



   Stage Description                      GFR(mL/min/1.73 m(2))



   1     Kidney damage with normal or decreased GFR       90



   2     Kidney damage with mild decrease in GFR          60-89



   3     Moderate decrease in GFR                         30-59



   4     Severe decrease in GFR                           15-29



   5     Kidney failure                       &lt;15 (or dialysis)

 

 36  FINAL INTERPRETATION:



   **  No HIV antibody is detected.  **



   .



   



   This information has been disclosed to you from confidential



   records which are protected by [place] State law.  State



   law prohibits you from making further disclosure of this



   information without the specific written consent of the



   person to whom it pertains, or as otherwise permitted by



   law.  Any unauthorized further disclosure in violation of



   state law may result in a fine or skilled nursing sentence or both.



   General authorization for the release of medical or other



   information is not, except in limited circumstances set



   forth in Part 63, Title 10, of [facility], sufficient



   authorization for further disclosure.  Disclosure of



   confidential HIV information that occurs as the result of a



   general authorization for the release of medical or other



   information will be in violation of the state law and may



   result in a fine or a skilled nursing sentence.



   .

 

 37  Test Performed by:



   [facility] Dpt of Lab Med and Pathology



   [address] [place] [address]



   : Dilan Barrios III, M.D.

 

 38  -- REFERENCE VALUE --



   25-HYDROXY D TOTAL (D2+D3)



   Optimum levels in the normal



   population are 25-80



   Test Performed by:



   [facility] Dpt of Lab Med and Pathology



   [address] [place] [address]



   : Dilan Barrios III, M.D.

 

 39  -- REFERENCE VALUE --



   25-HYDROXY D TOTAL (D2+D3)



   Optimum levels in the normal



   population are 25-80



   Test Performed by:



   [facility] Med and Pathology



   [address] [place] [address]



   : Dilan Barrios III, M.D.

 

 40  REFERENCE RANGE: AM  8.7-22.4



   PM  LESS THAN 10



   .

 

 41  Test Performed by:



   [facility] Path[facility]



   [address] [place] [address]



   : Dilan Barrios III, M.D.

 

 42  Test Performed by:



   [facility]



   [address] [place] [address]



   : Dilan Barrios III, M.D.

 

 43  ** Note change in reference range as of 08.  The



   change was based on recommendations from the American



   Diabetes Association.

 

 44  Test Performed by:



   [facility] Path[facility]



   [address] [place] [address]



   : Dilan Barrios III, M.D.

 

 45  Interpretation: Negative (&lt;=1.0)



   



   Test Performed by:



   [facility] Dpt [facility] Med and Pathology



   [address] [place] [address]



   : Dilan Barrios III, M.D.

 

 46  Average risk



   



   Test Performed by:



   [facility] Dpt [facility] Med and Pathology



   [address] [place] [address]



   : Dilan Barrios III, M.D.

 

 47  TEST RESULT RETURNED FROM REFERENCE LABORATORY AND HARDCOPY



   SENT TO PHYSICIAN(S) OFFICE.

 

 48  TEST RESULT RETURNED FROM REFERENCE LABORATORY AND HARDCOPY



   SENT TO PHYSICIAN(S) OFFICE.

 

 49  TEST RESULT RETURNED FROM REFERENCE LABORATORY AND HARDCOPY



   SENT TO PHYSICIAN(S) OFFICE.

 

 50  TEST RESULT RETURNED FROM REFERENCE LABORATORY AND HARDCOPY



   SENT TO PHYSICIAN(S) OFFICE.

 

 51  TEST RESULT RETURNED FROM REFERENCE LABORATORY AND HARDCOPY



   SENT TO PHYSICIAN(S) OFFICE.

 

 52  TEST RESULT RETURNED FROM REFERENCE LABORATORY AND HARDCOPY



   SENT TO PHYSICIAN(S) OFFICE.

 

 53  TEST RESULT RETURNED FROM REFERENCE LABORATORY AND HARDCOPY



   SENT TO PHYSICIAN(S) OFFICE.

 

 54  ELYSE VALUE=2.01 (AS OF 07



   



   Recommended INR for Patients



   on Oral Anticoagulants



   



   



   Prophylaxis                       2.0 - 3.0



   Treatment of thrombosis           2.0 - 3.0



   Prevention of embolism            2.0 - 3.0



   Prevention of embolism from



   prosthetic heart valves        2.5 - 3.5

 

 55  Test Performed by:



   [facility] Dpt of Lab Med and Pathology



   [address] [place] [address]



   : Dilan Barrios III, M.D.

 

 56  NORMAL RANGE



   -------------------------------------------------



   MALES                              1 - 20



   



   NORMALLY MENSTRUATING FEMALES



   - Follicular Phase                 3 - 9



   - Mid-Cycle Peak                   4 - 23



   - Luteal Phase                     1 - 6



   



   POSTMENOPAUSAL FEMALES            16 - 114



   ----------------------------------------------------------



   



   .

 

 57  NORMAL RANGE



   --------------------------------------------------



   MALES                                 2 - 12



   NORMALLY MENSTRUATING FEMALES



   - Follicular Phase                    1 - 18



   - Mid-Cycle Peak                     24 - 105



   - Luteal Phase                      0.6 - 20



   



   POSTMENOPAUSAL FEMALES               15 - 62



   -----------------------------------------------------------



   .

 

 58  EXPECTED RESULTS



   (pg/ml)



   



   MALES                                  20-75



   POSTMENOPAUSAL FEMALES                 20-88



   ____________________________________________________________



   



   NON PREGNANT FEMALES



   



   Mid-Follicular Phase                   



   Periovulatory                          



   Mid Luteal Phase                       

 

 59  Anion gap measurement may be of limited value in the



   presence of any alkalosis, especially in a combined acid



   base disorder.



   .

 

 60  ** Note change in reference range as of 08.  The



   change was based on recommendations from the American



   Diabetes Association.

 

 61  Please note change in reference range effective 08



   



   



   .

 

 62  FASTING

 

 63  CHOLESTEROL INTERPRETATION:



   Desirable:  Less than 200 MG/DL



   Borderline-High Risk:  200-239 MG/DL



   High-Risk:  240 MG/DL and over

 

 64  HDL INTERPRETATION:



   Undesirable: High Risk:  Less than 40 MG/DL



   Desirable:  Low Risk:  Greater than 60 MG/DL

 

 65  LDL INTERPRETATION:



   Low Risk Optimal Level:  LDL Less than 100 MG/DL



   Near or Above Optimal:  -129 MG/DL



   Borderline High Risk:  -159 MG/DL



   High Risk:  -189 MG/DL



   Very High Risk:  LDL Greater than 189 MG/DL







Procedures







 Date  CPT Code  Description  Status

 

 2014  68798  Treadmill Interp/Report Only  Completed

 

 2014  78813  Stress Test Supervsn W/Out I/R  Completed

 

 2014  41129  EKG Tracing &amp; Interpretation  Completed

 

 2014  67266  EKG Tracing &amp; Interpretation  Completed

 

 2014  29232  EKG Tracing &amp; Interpretation  Completed

 

 2014  38179  EEG Recording Awake &amp; Drowsy  Completed

 

 2011  32203  EKG Tracing &amp; Interpretation  Completed

 

 2011  87202  EKG Tracing &amp; Interpretation  Completed

 

 2010  85896  EKG Tracing &amp; Interpretation  Completed

 

 2010    Mammogram  Completed







Encounters







 Type  Date  Location  Provider  CPT E/M  Dx

 

 [facility]  R53.83



   8:50a  Infectious Diseases  FREDI Valderrama    

 

 Office Visit  2018  [facility]  Gus Winchester [place]  80020  A69.20



   11:40a  - Francie      









 R51









 Office Visit  2016  4:00p  [facility]  Riaz Olson,  
21713  R53.82



     - Francie RAI    

 

 Office Visit  2014  4:20p  [facility]  Yenni Avila M.D.  
40327  401.1



     - [place]      

 

 Office Visit  2014  2:00p  [facility]  Alice Sharp M.D.  
82001  786.50



     [facility]      









 401.1









 Office Visit  2014  3:40p  [facility]  Yenni Avila M.D.  
99128  401.9



     - [place]      









 786.50

 

 309.81









 Office Visit  01/10/2014  1:00p  [facility]  Gladys Contreras M.D.  02553
  780.02



     [facility]      

 

 Office Visit  2013  9:00a  [facility]  Gladys Contrears M.D.  85348
  784.59



     [facility]      









 781.0

 

 346.70

 

 300.00









 Office Visit  10/17/2013  1:00p  [facility] -  Beena Crawley, N.P.
  [address]57  311



     [place]      









 723.1









 Office Visit  2013  3:00p  [facility]  Beena Crawley N.P.  
61177  780.39



     - [place]      









 311

 

 309.81

 

 729.1

 

 268.9

 

 272.0

 

 346.00

 

 V76.12

 

 V70.9

 

 238.2

 

 727.1

 

 524.60









 Office Visit  2013  2:00p  [facility]  Beena Crawley, N.P.  
75954  780.39



     - [place]      









 311

 

 309.81

 

 729.1

 

 V77.1

 

 V77.91









 Office Visit  2013  3:30p  [facility]  Beena Crawley, N.P.  
27999  309.81



     - [place]      









 311

 

 729.1









 Office Visit  2013 11:20a  [facility]  Tawnya Will D.O.  
59355  729.1









 786.50









 Office Visit  02/15/2013  3:30p  [facility]  Beena Crawley, N.P.  
65161  724.2



     - [place]      









 729.1

 

 524.60

 

 268.9

 

 V76.10

 

 V77.91

 

 V77.1









 Office Visit  2013  4:00p  [facility]  Riaz Olson,  
16084  729.1



     - Francie RAI    









 783.1

 

 268.9









 Office Visit  2013  3:40p  [facility]  Riaz Olson,  
23776  729.1



     - Francie RAI    









 796.2

 

 268.9

 

 272.0

 

 783.1









 Office Visit  2012  2:40p  [facility]  Tawnya Will,  
17623  786.50



     [facility]  D.OSergey    









 272.4

 

 796.2

 

 729.1









 Office Visit  2012  9:40a  [facility]  Riaz Olson,  
23135  893.0



     - Francie RAI    

 

 Office Visit  10/11/2011  2:20p  DO Not Use [facility] At  Cholo Delong,  77939  
382.9



     Tiana RAI    









 E988.1

 

 783.1









 Office Visit  2011  3:40p  DO Not Use [facility] At  Riaz Olson,  17619  
729.1



     Tiana RAI    









 780.79









 Office Visit  2011  9:40a  DO Not Use [facility] At  Riaz Olson,  23680  
462



     Tiana RAI    

 

 Office Visit  2011  9:40a  [facility]  Tawnya Will,  21284  
786.50



       D.O.    









 785.1









 Office Visit  2011  3:40p  DO Not Use [facility] At  Riaz APRIL Olson,  63551  
786.50



     [facility]  M.D.    

 

 Office Visit  07/15/2010  2:40p  DO Not Use [facility] At  Riaz APRIL Olson,  72964  
729.1



     [facility]  M.D.    

 

 Office Visit  2010 12:00p  DO Not Use [facility] At  [facility],  92969  
786.51



     [facility].D.,FACP    









 530.81

 

 729.1









 Office Visit  2010  1:40p  DO Not Use [facility] At  [facility],  95386  
268.9



     [facility].HARRISON,FACP    









 729.1









 Office Visit  2010  8:45a  DO Not Use [facility] At  Felix Ramires M.D.  
63237  524.60



     [facility]      









 729.1









 Office Visit  2010  3:00p  DO Not Use [facility] At  [facility]  [address]  
729.1



     [facility].HARRISON,FACP    









 255.5









 Office Visit  2010 11:30a  DO Not Use [facility] At  [place]  [address]  
729.1



     [facility]      









 309.0









 Office Visit  2010  9:00a  DO Not Use [facility] At  [place]  [address]  
729.1



     [facility]      

 

 Office Visit  2009  3:00p  DO Not Use [facility] At  [place]  [address]458  
729.1



     [facility]      

 

 Office Visit  2009  2:00p  DO Not Use [facility] At  [place]  [address]  
729.1



     [facility]      

 

 Office Visit  2009 11:00a  DO Not Use [facility] At  [place]  [address]  
787.02



     [facility]      









 338.4

 

 292.0









 Office Visit  2009  9:30a  DO Not Use [facility] At  [place]  [address]  
780.79



     [facility]      

 

 Office Visit  2009  8:30a  [facility] Not Use [facility]  [facility] [place]  [address]  
719.47



     [facility]      

 

 Office Visit  2009 11:30a  DO Not Use [facility]  [facility] [place]  [address]  
729.1



     [facility]      

 

 Office Visit  2009  3:00p  DO Not Use [facility]  [facility] [place]  [address]  
729.1



     [facility]      

 

 Office Visit  2008  2:30p  DO Not Use [facility]  [facility] [place]  [address]  
338.4



     [facility]      

 

 Office Visit  10/22/2008  3:30p  DO Not Use [facility]  [facility] [place]  [address]  
729.1



     [facility]      









 309.0









 Office Visit  10/20/2008  9:00a  DO Not Use [facility]  [facility] [place]  [address]  
729.1



     [facility]      

 

 Office Visit  2008  2:00p  [place]  [address]  
782.7



     [facility]      









 729.1

 

 625.4









 Office Visit  07/15/2008  9:00a  DO Not Use [facility]  [facility] [place]  [address]  
729.1



     [facility]      

 

 Office Visit  2008  9:00a  DO Not Use [facility] At  [facility] [place]  [address]  
729.1



     [facility]      

 

 Office Visit  2008  9:30a  DO Not Use [facility]  [facility] [place]  [address]  
461.9



     [facility]      









 729.1









 Office Visit  2007  9:20a  DO Not Use Cma At  [place],  [address]  
461.9



     Tiana RAI,FACP    









 V04.81









 Office Visit  2007  3:20p  DO Not Use Cma At  [place],  69772  
780.39



     Tiana RAI,FACP    









 780.52

 

 346.00







Plan of Care

Future Appointment(s):02/15/2018 10:20 am - Gus Winchester NP at [facility] 
[facility] - Bgrawrzlx47/31/2018 - Kelsi Thomas, NPZ00.00 Encntr for general 
adult medical exam w/o abnormal findingsComments:Please [facility]ion[facility] 
with [facility] for pelvic and pap smearDrink plenty of fluids eat 
fresh fruits and vegetablesUse sun screen when outside to prevent skin cancer/
burningFollow up:Print [facility] for patient  Physical in 1 yearR53.83 Other 
fatigueComments:I have ordered non fasting  blood work that you can have done 
today.I will notify you of the resultsI will refer you to Dr Suero:
Brady Monroe MD, Rheumatology

## 2018-02-19 NOTE — XMS REPORT
Flora Figueroa

 Created on:2018



Patient:Flora Figueroa

Sex:Female

:1970

External Reference #:[id number]





Demographics







 Address  [address]



   [place] [address]

 

 Mobile Phone  [phone]

 

 Email Address  [email or web address]

 

 Preferred Language  English

 

 Marital Status  Not  Or 

 

 Hindu Affiliation  Unknown

 

 Race  White

 

 Ethnic Group  Not  Or 









Author







 Organization  [facility]

 

 Address  [address] [address]

 

 Phone  [phone]









Care Team Providers







 Name  Role  Phone

 

 Riaz Olson III, MD  Primary Care Physician  Unavailable









Payers







 Type  Date  Identification Numbers  Payment Provider  Subscriber

 

 Medicare Primary  Effective:  Policy Number:  Medicare  Flora Figueroa



   2013  [id number]    









 PayID: [id number]  [address]









 [place] [address]









 [facility]    Policy Number: [id number]  Medicaid  Flora Figueroa









 Group Name: 1 1  [address]

 

 PayID: [id number]  [place] [address]







Problems







 Date  Description  Provider  Status

 

 Onset: 2007  Seizure  Jose Manuel Tejada M.D.,FACP  Active

 

 Onset: 2007  Insomnia  Jose Manuel Tejada M.D.,FACP  Active

 

 Onset: 2007  Migraine with typical aura  Jose Manuel Tejada M.D.,FACP  
Active

 

 Onset: 2012  Chest pain  Tawnya Will D.O.  Active

 

 Onset: 2013  Myalgia &amp; Myositis Unspec  Riaz Olson M.D.  
Active

 

 Onset: 2013  Pure hypercholesterolemia  Riaz Olson M.D.  Active







Family History







 Date  Family Member(s)  Problem(s)  Comments

 

   General  Coronary Artery Disease (CAD)  M bro  50's MI,



       and other family



       members on maternal



       side

 

 :  (age  Father   due to MI  (+) smoker



 48 Years)      

 

   Father  Coronary Artery Disease (CAD)   48 yo MI

 

 Onset:  (age 19  Mother  Hypertension  



 Years)      

 

   Mother   due to MI  () - age



       57; no prior heart



       problems. (+)



       smoker, HTN

 

   Mother  Hypercholesterolemia  

 

   Mother  Coronary Artery Disease (CAD)   59 yo MI

 

   Paternal Grandmother  Hypertension  

 

   Maternal Grandfather   due to MI  () - age



       60's

 

   Maternal Grandmother   due to MI  () - age



       60's







Social History







 Type  Date  Description  Comments

 

 Marital Status    Single  

 

 Occupation    currently not working  

 

 Cigarette Use    Never Smoked Cigarettes  

 

 ETOH Use    Rarely consumes alcohol  

 

 Recreational Drug Use    Denies Drug Use  

 

 Smoking    Patient has never smoked  

 

 Daily Caffeine    Consumes on average 1 cup of  



     regular coffee per day  

 

 Enjoy Exercising    Does not enjoy exercising  

 

 Exercise Type/Frequency    Exercises regularly  occasionally walking/ tennis

 

 General Hx Text      1 daughter







Allergies, Adverse Reactions, Alerts







 Date  Description  Reaction  Status  Severity  Comments

 

 2010  Wellbutrin XL  agitation, anxiety and  active    



     muscle cramps      

 

 2011  Lyrica  edema  active    

 

 2011  Gluten    active    

 

 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  

 

 2013  Neurontin  hyperactive  active    

 

 2013  Clonazepam  aggitated  active    

 

 10/17/2013  Tizanidine    active    







Medications







 Medication  Date  Status  Form  Strength  Qnty  SIG  Indications  Ordering



                 Provider

 

 Doxycycline  /  Active  Capsules  100mg  42cap  si  A69.20  Gus



 Monohydrate  2018        s  twice a day    ROSAS Winchester



             x 21 days    

 

 Naproxen  /  Active  Tablets  500mg  20tab  1 tablet  R51  Gus



   2018        s  with food    ROSAS Winchester



             by mouth    



             twice a day    



             as needed    

 

 Blood Pressure  /  Active  Misc    1unit  in the  401.9  Yenni



 Monitor          s  morning and    Hardeep Avila/Manual            at night    M.DSergey



 Inflate                

 

 Garlic  /  Active        daily    Unknown



   0000              

 

 Adderall XR  /  Active  Caps ER  30mg    1 by mouth    Unknown



   0000    24HR      every day    

 

 Klonopin  /  Active  Tablets  1mg    1po  a day    Unknown



   0000              

 

 Cholestyramine  /  Active  Packet  4gm    mix one    Sherry,



 Guerrero  0000          packet in    nayeli Griffin and    MD



             take by    



             mouth twice    



             a day    

 

 Zinc  /  Active  Tablets  50mg    1 tab daily    Unknown



   0000          (otc)    

 

 B6 Natural  0000/  Active  Tablets  50mg    take one    Unknown



   0000          capsule/tab    



             let daily    



             by mouth    

 

 D3-1000  0000/  Active  Capsules  5000Unit    please take    Unknown



   0000          1 tab daily    

 

 Zyflammend  /  Active        2 caps    Unknown



   0000          daily    

 

 Probiotic  /  Active  Capsules      1 by mouth    Unknown



   0000          every day    

 

                 

 

 Cholestyramine  /  Hx  Packet  4gm  50uni  as directed    Yenni Avila,



                 MELENA



   2016              

 

 Propranolol HCL  /  Hx  Caps ER  80mg  30cap  1 tab po in  401.1  Yenni SANDERS  2014 -    24HR    s  the evening    Austin



                 MELENA



   2016              

 

 Lisinopril  /  Hx  Tablets  10mg  30tab  1 by mouth  401.9  Yenni



    Arley        s  every day    Austin



   ELENA



                 

 

 Aspir-81  /  Hx  Tablets  81mg  100ta  1 by mouth  401.9  Yenni Tubbs DR    bs  every day    Austin



                 MELENA



                 

 

 Atenolol  /  Hx  Tablets  25mg  90tab  1 by mouth  401.9  Yenni



    Arley        s  every day    Austin



                 MELENA



                 

 

 Propranolol HCL  /  Hx  Tablets  10mg    1 by mouth    Other



   2014 -          three times    Ordering



   /          a daily    Provider



                 

 

 Lamotrigine  /  Hx  Tablets  25mg  60tab  take 1 po    Gladys



    -        s  qhs x 2    Ben,



   /          weeks, then    M.DSergey



   2014          1 po bid    

 

 Cyclobenzaprine  10/17/  Hx  Tablets  10mg  30tab  one po tid  723.1  Beena



 HCL   -        s  prn spasm    Misbah,



   /              N.P.



                 

 

 Ibuprofen  /  Hx  Tablets  200mg  100ta  3 tablets    Beena



    Arley        bs  prn    Misbah,



   /              N.P.



                 

 

 Cyclobenzaprine  /  Hx  Tablets  5mg  20tab  1 tab po  729.1  Beena



 HCL   -        s  tid prn    Misbah,



   10/17/              N.P.



   2013              

 

 Venlafaxine HCL  /  Hx  Tablets  225mg  30tab  1 tab po qd    Beena



 ER   -    ER 24HR    s      Misbah,



   /              N.P.



   2013              

 

 Tramadol HCL  /  Hx  Tablets  50mg  90tab  1 tab po q  729.1  Beena



    -        s  4-6 hours    Misbah,



   /          prn;    N.P.



   2013              

 

 Aleve  /  Hx  Capsules  220mg  60cap  prn    Other



    -        s      Ordering



   /              Provider



   2016              

 

 Adderall  /  Hx  Tablets  20mg  60tab  1 po bid    Other



    -        s      Ordering



   /              Provider



   2014              

 

 Gabapentin  02/15/  Hx  Capsules  300mg  90cap  300 mg po  729.1  Beena



    -        s  qd x1 day,    Misbah,



   /          then 300 mg    N.P.



   2013          po bid x1    



             day, then    



             300 mg po    



             tid    

 

 Cyclobenzaprine  02/15/  Hx  Tablets  5mg  30tab  1 tab po  524.60  Beena



 HCL   -        s  qhs    Misbah,



   /              N.P.



   2013              

 

 Ergocalciferol  02/15/  Hx  Capsules  08830Splr  12cap  1 cap po q1  268.9  
Beena



    -        s  week for 2    Misbah,



   /          months then    N.P.



   2013          2x/mon    

 

 Butrans  /  Hx  Patches  10mcg/HR  4unit  apply 1    Riaz CHEN



    -    Weekly    s  patch    Whitney,



   /          weekly    M.D.



   2013              

 

 Prazosin HCL  /  Hx  Capsules  1mg    1 po tid    Riaz CHEN



   2012 -              Whitney,



   /              M.D.



   2012              

 

 Ibuprofen  10/11/  Hx  Tablets  600mg  45tab  tid  382.9  [place]



    -        s      Pachikara



   /              , M.D.



   2013              

 

 Amoxicillin  /  Hx  Capsules  500mg  30cap  1 tid for  462  Riaz CHEN



    -        s  10 days    Whitney,



   /              M.D.



   2011              

 

 Duragesic-25  /  Hx  Patches  25mcg/HR  10uni  apply    Jose Manuel



    -    72HR    ts  topically    D. Terrell,



   10/13/          change q3    M.D.,FACP



             days    



               Use with    



             12mcg patch    

 

 Duragesic-12  /  Hx  Patches  12mcg/HR  10uni  topical q3d    Jose Manuel



    -    72HR    ts  with 25 mcg    D. Ookala,



   10/11/          patch    M.D.,FACP



                 

 

 Duragesic-50  /  Hx  Patches  50mcg/HR  10uni  apply  729.1  Jose Manuel



    -    72HR    ts  topically    D. Ookala,



   /          q3days    M.D.,FACP



   2010              

 

 Clonidine HCL  /  Hx  Tablets  0.1mg  60tab  1 po bid    Jose Manuel



    Arley Tejada,



   /              M.D.,FACP



   2011              

 

 Omeprazole  /  Hx  Capsules  20mg  30cap  1 po qd for  530.81  Jose Manuel gilliland  2 wks then    HARRISON Tejada,



   /          prn    M.D.,FACP



                 

 

 Savella Titration  /  Hx  Misc  12.5&amp;  1pak  as directed  729.1  Zohaib Chang   -      25&amp;50    samples    HARRISON Tejada,



   /      mg        M.D.,FACP



   2010              

 

 Tramadol HCL  /  Hx  Tablets  50mg  100ta  PO qid prn  729.1  Jose Manuel Tejada,



   /              M.D.,FACP



   2011              

 

 Duragesic-12  /  Hx  Patches  12mcg/HR  10uni  topical q3d    Jose Manuel



    -    72HR    ts  with 25 mcg    HARRISON Tejada,



   /          patch    M.D.,FACP



                 

 

 Adderall  /  Hx  Tablets  30mg  30tab  1 po daily    Jose Manuel Tejada,



   /              M.D.,FACP



   2013              

 

 Vitamin D  /  Hx  Capsules  1000Unit  30cap  2 po qd  268.9  Jose Manuel



    Arley Tejada,



   /              M.D.,FACP



   2012              

 

 Duragesic-25  /  Hx  Patches  25mcg/HR  10uni  apply  729.1  Jose Manuel



    -    72HR    ts  topically    HARRISON Tejada,



   /          change q3    M.D.,FACP



   2010          days    

 

 Lyrica  /  Hx  Capsules  25mg  60cap  1 po bid    Jose Manuel



    Arley Tejada,



   /              M.D.,FACP



                 

 

 Methadone HCL  /  Hx  Tablets  5mg  120ta  1-2 po bid    Jose Mnauel



    -        bs  prn pain    HARRISON Tejada,



   /              M.D.,FACP



   2010              

 

 Hydrocodone-Aceta  /  Hx  Tablets  10-325mg  40tab  1 q 4- 6  729.1  Felix delaney  2010 -        s  hours prn    Keyla



   /          pain    M.D.



                 

 

 Methadone HCL  /  Hx  Solution  5mg/5ML  600ml  5-10mg bid  729.1  Zohaib Easley



    -          prn pain    HARRISON Tejada,



                 M.D.,FACP



   2010              

 

 Savella  /  Hx  Tablets  100mg  60tab  1 bid  729.1  Jose Manuel



    -        s      HARRISON Tejada,



   .D.,FACP



   2010              

 

 Zofran  /  Hx  Tablets  4mg  60tab  1 q 6 hours  787.02  Jose Manuel



   2009 -        s  prn nausea    HARRISON Tejada,



   .D.,FACP



                 

 

 Methadone HCL  /  Hx  Tablets  5mg  90tab  1 po tid    Jose Manuel



   2009 -        s      HARRISON Tejada,



   .D.,FACP



   2009              

 

 Methadone HCL  /  Hx  Tablets  10mg  180ta  1or 2 tabs    Jose Manuel



   2009 -        bs  tid prn    HARRISON Tejada,



             pain    M.D.,FACP



   2010              

 

 Oxycodone HCL  /  Hx  Capsules  5mg  240ca  2tab q6hr  729.1  Jose Manuel



   2009 -        ps  prn    HARRISON Tejada,



   .D.,FACP



                 

 

 Oxycontin  /  Hx  Tablets  20mg  60tab  1-2 q12h    Jose Manuel



   2009 -    ER 12HR    darshana Tejada,



   .D.,FACP



   2009              

 

 Oxycodone HCL  /  Hx  Tablets  10mg  120ta  1 q 6 hours  729.1  Jose Manuel



    -        bs  prn pain    HARRISON Tejada,



   .D.,FACP



                 

 

 Cymbalta  /  Hx  Caps DR  30mg  60cap  po qd    Jose Manuel



    -    PA    s      HARRISON Tejada,



   .D.,FACP



                 

 

 Lamictal  /  Hx  Tablets  25mg    2 po qd    Jose Manuel Lopez -              HARRISON Tejada,



   .D.,FACP



   2009              

 

 Methadone HCL  /  Hx  Tablets  10mg  120ta  1or 2 tabs  338.4  Jose Manuel



    -        bs  po tid    AHRRISON Tejada,



   03/12/              M.D.,FACP



   2009              

 

 Duragesic  10/01/  Hx  Patches  50mcg/HR  10uni  1 patch q72  729.1  Jose Manuel



    -    72HR    ts      DSergey Tejada,



                 M.D.,FACP



   2008              

 

 Duragesic  /  Hx  Patches  25mcg/HR  10uni  topical  729.1  Jose Manuel



    -    72HR    ts  q3days    HARRISON Tejada,



                 M.D.,FACP



   2008              

 

 Ultram  /  Hx  Tablets  50mg  40tab  1 or 2 tabs    Riaz CHEN



   2008 -        s  q 6 hours    Whitney,



   /          prn pain    M.D.



   2013              

 

 Skelaxin  /  Hx  Tablets  800mg  90tab  1 q 8 hours    Jose Manuel



   2008 -        s  prn muscle    D. Terrell,



   10/22/          spasms    M.D.,FACP



   2008              

 

 Duragesic  07/15/  Hx  Patches  50mcg/HR  10uni  1 patch  729.1  Jose Manuel



    -    72HR    ts  every 72    D. Terrell,



   /          hours    M.D.,FACP



   2008              

 

 Hydrocodone/Apap  /  Hx  Tablets  10-325mg  180ta  1 q 4 hours  729.1  
Jose Manuel



   2008 -        bs  prn pain    HARRISON Tejada,



                 M.D.,FACP



   2010              

 

 Cephalexin  /  Hx  Capsules  500mg  30cap  1 tid x 10  461.9  Jose Manuel



   2008 -        s  days    HARRISON Tejada,



   07/15/              M.D.,FACP



   2008              

 

 Lyrica  /  Hx  Capsules  50mg  90cap  1 po tid  729.1  Jose Manuel



   2008 -        s      HARRISON Tejada,



   10/22/              M.D.,FACP



   2008              

 

 Methadone  /  Hx  Tablets  10mg  12tab  1tab tid x    Jose Manuel



   2008 -        s  2days    HARRISON Tejada,



   /              M.D.,FACP



   2008               1tab    



             bid x 2days    



                 



                 



             1tab qd x    



             2days    

 

 Lamictal  /  Hx  Tablets  25mg  3Mont  bid    Jose Manuel



   2007 -        h      HARRISON Tejada,



   /              M.D.,FACP



   2009              

 

 Amoxicillin  /  Hx  Capsules  500mg  40cap  2 bid for  461.9  Jose Manuel



   2007 -        s  10 days    HARRISON Tejada,



   /              M.D.,FACP



   2008              

 

 Remeron  /  Hx  Tablets  30mg    1  Tabs PO    Jose Manuel



    -          Q hs    HARRISON Tejada,



   /              M.D.,FACP



   2008              

 

 Ambien  /  Hx  Tablets  10mg  30tab  1 po qhs    Jose Manuel



    -        s  prn sleep    HARRISON Tejada,



   /          insomnia    M.D.,FACP



   2008              

 

 Lamictal  /  Hx  Tablets  25mg    qd,    Jose Manuel



    -          increasing    HARRISON Tejada,



   /              M.D.,FACP



                 

 

 Ambien  /  Hx  Tablets  10mg.  10tab  1 hs prn    Jose Manuel



         HARRISON Tejada,



   /              M.D.,FACP



                 

 

 Zoloft  /  Hx  Tablets  50mg  30tab  1 PO qd    Unknown



   0000 -        s      



   2008              

 

 Lunesta  /  Hx            Unknown



   0000 -              



   2009              

 

 Cymbalta  /  Hx  Caps DR  60mg    1 PO qd    Unknown



   0000 -    Part          



   2009              

 

 Abilify  /  Hx  Tablets  5mg    1 po qd    Unknown



    -              



   2010              

 

 Wellbutrin XL  /  Hx  Tablets  450  90tab  once qd  729.1  Unknown



   0000 -    ER 24HR    s      



   2010              

 

 Savella  /  Hx  Tablets  50mg  60tab  1 po bid  729.1  Jose Manuel



         HARRISON Tejada,



   /              M.D.,FACP



                 

 

 Effexor  /00/  Hx  Tablets  37.5mg    3 po qd    Unknown



   0000 -              



   2011              

 

 Klonopin  00/  Hx  Tablets  2mg    1/2-1 tab    Unknown



   0000 -          up to qid    



   /          prn    



                 

 

 Effexor XR  /00/  Hx  Caps ER  150mg    1 tab po qd    Unknown



   0000 -    24HR      am    



   2013              

 

 Adderall  00/00/  Hx  Tablets  10mg    1 by mouth    Unknown



   0000 -          daily    



   2012              

 

 Valium  00/00/  Hx  Tablets  10mg  10tab  1 po qd    Unknown



   0000 -        s      



   2011              

 

 Multi Vitamin  00/00/  Hx  Liquid      1 po qd    Unknown



   0000 -              



   2012              

 

 Remeron  00/00/  Hx  Tablets  15mg  90tab  1 po hs    Unknown



   0000 -        s      



   2011              

 

 Aspirin  00/00/  Hx  Tablets  81mg  50tab  1 po qd    Unknown



   0000 -    DR    s      



   2012              

 

 Doxepin HCL  00/  Hx  Capsules  25mg    one po qday    Unknown



   0000 -              



   2012              

 

 Butrans  00/00/  Hx  Patches  15mcg  4unit  topical    Unknown



   0000 -    Weekly    s  q7days    



   2012              

 

 Augmentin  /  Hx  Tablets  875mg  20tab  1 po bid    Unknown



   0000 -        s  for ten    



   10/13/          days    



   2011              

 

 Venlafaxine HCL  0000/  Hx  Caps ER  150mg  30cap  1 po bid    Unknown



 ER  0000 -    24HR    s      



   2012              

 

 Tizanidine HCL  /  Hx  Tablets  4mg  60tab  take 1    Unknown



   0000 -        s  tablet po    



    and in    



             the am for    



             spasm.    

 

 Clonazepam  00/00/  Hx  Tablets  1mg  90tab  1 po q bid    Unknown



   0000 -        s      



   2013              

 

 Adderall XR  /  Hx  Caps ER  30mg  30cap  1 capsule    Unknown



   0000 -    24HR    s  po qday    



   2012              

 

 Aleve  00/00/  Hx  Capsules  220mg  60cap  1 po bid    Unknown



   0000 -        s  prn    



   2012              

 

 Brainstorm  /00/  Hx            Unknown



    -              



   2013              

 

 Pete Oil  /00/  Hx            Unknown



    -              



   2013              

 

 Elisabet Sagar Colin  00/00/  Hx            Unknown



    -              



   2013              

 

 Naprosyn  /00/  Hx  Tablets    60tab  1 po bid    Unknown



   0000 -        s      



   2013              

 

 Zanaflex  00/  Hx  Capsules    270ca  po tid prn    Unknown



   0000 -        ps      



   2012              

 

 Aspirin DR  /  Hx  Tablets  81mg    1 po qd    Unknown



   0000 -    DR          



   2013              

 

 Prazosin HCL  00/  Hx  Capsules  2mg    1-2 prn    Unknown



    -              



   2013              

 

 Vitamin D3 High  /00/  Hx  Capsules  5000Unit    1 tabs    Unknown



 Potency  0000 -          daily    



   02/15/              



   2013              

 

 Melatonin  00/00/  Hx  Capsules  5mg  30cap  1 po qhs    Unknown



   0000 -        s      



   2013              

 

 Zyprexa  /  Hx  Solution  2.5    sublingual    Unknown



   0000 -    Rec          



   2013              

 

 Temazepam  00/00/  Hx  Capsules    30cap  1 po qhs    Unknown



   0000 -        s      



   2013              

 

 Venlafaxine HCL  00/  Hx  Tablets  150mg  90tab  1 po qd    Unknown



 ER  0000 -    ER 24HR    s      



   2014              

 

 Clonazepam  /00/  Hx  Tablets  1mg  20tab  1-3 tablets    Unknown



   0000 -        s   po prn    



   2013              

 

 Ibuprofen  /00/  Hx  Tablets  200mg  100ta  3 tabs prn    Unknown



   0000 -        bs      



   2016              

 

 Trazodone HCL  00/  Hx  Tablets  50mg  30tab  1 tablet at    Unknown



   0000 -        s  bedtime as    



   2014              

 

 Stinging Nettle  /  Hx  Capsule      4 PO qd    Unknown



   0000 -              



   2014              

 

 Zyflammend  /  Hx        2 PO qd    Unknown



   0000 -              



   01/10/              



   2014              

 

 Tumeric  00/  Hx        4 PO qd    Unknown



   0000 -              



   2014              

 

 Omega 3  /  Hx  Capsules  1000mg  100ca  1 po qd.    Unknown



   0000 -        ps      



   2014              

 

 Magnesium  00/  Hx        daily    Unknown



   0000 -              



   2016              

 

 Effexor XR  /  Hx  Caps ER  75mg    tapering    Unknown



   0000 -    24HR      dose    



   2016              







Immunizations







 CPT Code  Status  Date  Vaccine  Lot #

 

 66296  Given  2007  Influenza Virus 3Yrs &amp; Over  [id number]

 

 99403  Given  Unknown  Tetanus And Diptheria (Td) For Adult Use  



       Preservative Free  







Vital Signs







 Date  Vital  Result  Comment

 

 2018  Height  66 inches  5'6"









 Weight  152.00 lb  

 

 Heart Rate  92 /min  

 

 BP Systolic Sitting  124 mmHg  

 

 BP Diastolic Sitting  78 mmHg  

 

 Respiratory Rate  14 /min  

 

 Body Temperature  98.2 F  

 

 BMI (Body Mass Index)  24.5 kg/m2  









 2018  Weight  150.25 lb  









 Heart Rate  100 /min  

 

 BP Systolic  140 mmHg  

 

 BP Diastolic  86 mmHg  

 

 Body Temperature  98.3 F  

 

 O2 % BldC Oximetry  99 %  









 2016  Weight  175.00 lb  









 Heart Rate  82 /min  

 

 BP Systolic Sitting  145 mmHg  

 

 BP Diastolic Sitting  101 mmHg  

 

 Body Temperature  98.7 F  









 2014  Height  66.5 inches  5'6.50"









 Weight  196.00 lb  

 

 Heart Rate  83 /min  

 

 BP Systolic Sitting  142 mmHg  

 

 BP Diastolic Sitting  86 mmHg  

 

 O2 % BldC Oximetry  98 %  

 

 BMI (Body Mass Index)  31.2 kg/m2  









 2014  Height  66.5 inches  5'6.50"









 Weight  193.00 lb  

 

 BP Systolic  130 mmHg  Ra large cuff

 

 BP Diastolic  86 mmHg  Ra large cuff

 

 BP Systolic Sitting  112 mmHg  LA large cuff

 

 BP Diastolic Sitting  82 mmHg  LA large cuff

 

 BP Systolic Standing  118 mmHg  LA

 

 BP Diastolic Standing  88 mmHg  LA

 

 Respiratory Rate  16 /min  

 

 BMI (Body Mass Index)  30.7 kg/m2  









 2014  Weight  193.75 lb  









 Heart Rate  86 /min  

 

 BP Systolic Sitting  141 mmHg  

 

 BP Diastolic Sitting  97 mmHg  









 01/10/2014  Heart Rate  76 /min  









 BP Systolic Sitting  130 mmHg  

 

 BP Diastolic Sitting  70 mmHg  

 

 Respiratory Rate  16 /min  









 2013  Heart Rate  80 /min  









 BP Systolic Sitting  150 mmHg  

 

 BP Diastolic Sitting  90 mmHg  

 

 Respiratory Rate  17 /min  









 10/17/2013  Weight  189.75 lb  









 Heart Rate  98 /min  

 

 BP Systolic Sitting  132 mmHg  

 

 BP Diastolic Sitting  90 mmHg  









 2013  Height  66.5 inches  5'6.50"









 Weight  193.50 lb  

 

 Heart Rate  88 /min  

 

 BP Systolic Sitting  124 mmHg  

 

 BP Diastolic Sitting  88 mmHg  

 

 BMI (Body Mass Index)  30.8 kg/m2  









 2013  Height  66.75 inches  5'6.75"









 Weight  193.50 lb  

 

 Heart Rate  96 /min  

 

 BP Systolic Sitting  136 mmHg  

 

 BP Diastolic Sitting  106 mmHg  

 

 BMI (Body Mass Index)  30.5 kg/m2  









 2013  Weight  191.00 lb  









 Heart Rate  105 /min  

 

 BP Systolic Sitting  125 mmHg  

 

 BP Diastolic Sitting  90 mmHg  









 2013  Height  66.5 inches  5'6.50"









 Weight  193.75 lb  

 

 Heart Rate  96 /min  

 

 BP Systolic Sitting  130 mmHg  

 

 BP Diastolic Sitting  90 mmHg  

 

 BMI (Body Mass Index)  30.8 kg/m2  









 02/15/2013  Height  66.5 inches  5'6.50"









 Weight  184.00 lb  

 

 Heart Rate  72 /min  

 

 BP Systolic Sitting  122 mmHg  

 

 BP Diastolic Sitting  70 mmHg  

 

 BMI (Body Mass Index)  29.3 kg/m2  









 2013  Height  66.5 inches  5'6.50"









 Weight  189.75 lb  

 

 Heart Rate  84 /min  

 

 BP Systolic Sitting  140 mmHg  

 

 BP Diastolic Sitting  88 mmHg  

 

 BMI (Body Mass Index)  30.2 kg/m2  









 2013  Height  66.5 inches  5'6.50"









 Weight  190.00 lb  

 

 Heart Rate  88 /min  

 

 BP Systolic Sitting  154 mmHg  

 

 BP Diastolic Sitting  102 mmHg  

 

 BMI (Body Mass Index)  30.2 kg/m2  









 2012  Height  66.50 inches  5'6.50"









 Weight  177.00 lb  

 

 Heart Rate  93 /min  

 

 BP Systolic Sitting  150 mmHg  

 

 BP Diastolic Sitting  100 mmHg  

 

 BMI (Body Mass Index)  28.1 kg/m2  









 2012  Height  66.50 inches  5'6.50"









 Weight  176.00 lb  

 

 Heart Rate  76 /min  

 

 BP Systolic Sitting  124 mmHg  

 

 BP Diastolic Sitting  88 mmHg  

 

 BMI (Body Mass Index)  28.0 kg/m2  









 10/11/2011  Height  66.50 inches  5'6.50"









 Weight  166.00 lb  

 

 Heart Rate  68 /min  

 

 BP Systolic Sitting  150 mmHg  

 

 BP Diastolic Sitting  90 mmHg  

 

 BMI (Body Mass Index)  26.4 kg/m2  









 2011  Heart Rate  80 /min  









 BP Systolic  124 mmHg  

 

 BP Diastolic  90 mmHg  









 2011  Weight  169.00 lb  









 Heart Rate  62 /min  

 

 BP Systolic Sitting  112 mmHg  

 

 BP Diastolic Sitting  70 mmHg  

 

 Body Temperature  101.5 F  lt ear









 2011  Height  66 inches  5'6"









 Weight  167.00 lb  

 

 Heart Rate  86 /min  

 

 BP Systolic  120 mmHg  

 

 BP Diastolic  70 mmHg  

 

 BP Systolic Sitting  122 mmHg  

 

 BP Diastolic Sitting  80 mmHg  

 

 BP Systolic Standing  120 mmHg  

 

 BP Diastolic Standing  80 mmHg  

 

 Respiratory Rate  16 /min  

 

 BMI (Body Mass Index)  27.0 kg/m2  









 2011  Height  66.25 inches  5'6.25"









 Weight  167.00 lb  

 

 Heart Rate  84 /min  

 

 BP Systolic Sitting  130 mmHg  

 

 BP Diastolic Sitting  86 mmHg  

 

 BMI (Body Mass Index)  26.7 kg/m2  









 07/15/2010  Height  66.25 inches  5'6.25"









 Weight  163.00 lb  

 

 Heart Rate  88 /min  

 

 BP Systolic Sitting  142 mmHg  

 

 BP Diastolic Sitting  94 mmHg  

 

 BMI (Body Mass Index)  26.1 kg/m2  









 2010  Height  66.25 inches  5'6.25"









 Weight  161.00 lb  

 

 Heart Rate  92 /min  

 

 BP Systolic Sitting  130 mmHg  

 

 BP Diastolic Sitting  80 mmHg  

 

 BMI (Body Mass Index)  25.8 kg/m2  









 2010  Height  66.25 inches  5'6.25"









 Weight  161.75 lb  

 

 Heart Rate  76 /min  

 

 BP Systolic  118 mmHg  

 

 BP Diastolic  92 mmHg  

 

 Respiratory Rate  16 /min  

 

 Body Temperature  98.7 F  

 

 BMI (Body Mass Index)  25.9 kg/m2  









 2010  Height  66.25 inches  5'6.25"









 Weight  156.00 lb  

 

 Heart Rate  80 /min  

 

 BP Systolic  114 mmHg  

 

 BP Diastolic  84 mmHg  

 

 BMI (Body Mass Index)  25.0 kg/m2  









 2010  Height  66.25 inches  5'6.25"









 Weight  158.00 lb  

 

 Heart Rate  60 /min  

 

 BP Systolic Sitting  120 mmHg  

 

 BP Diastolic Sitting  70 mmHg  

 

 BMI (Body Mass Index)  25.3 kg/m2  









 2010  Height  66.50 inches  5'6.50"









 Weight  156.50 lb  

 

 Heart Rate  84 /min  

 

 BP Systolic Sitting  124 mmHg  

 

 BP Diastolic Sitting  92 mmHg  

 

 BMI (Body Mass Index)  24.9 kg/m2  









 2010  Height  66.50 inches  5'6.50"









 Weight  160.50 lb  

 

 Heart Rate  96 /min  

 

 BP Systolic Sitting  130 mmHg  

 

 BP Diastolic Sitting  82 mmHg  

 

 BMI (Body Mass Index)  25.5 kg/m2  









 2009  Height  66.50 inches  5'6.50"









 Weight  161.00 lb  

 

 Heart Rate  84 /min  

 

 BP Systolic Sitting  116 mmHg  

 

 BP Diastolic Sitting  84 mmHg  

 

 BMI (Body Mass Index)  25.6 kg/m2  









 2009  Height  66.50 inches  5'6.50"









 Weight  162.00 lb  

 

 Heart Rate  84 /min  

 

 BP Systolic Sitting  134 mmHg  

 

 BP Diastolic Sitting  92 mmHg  

 

 BMI (Body Mass Index)  25.8 kg/m2  









 2009  Height  66.50 inches  5'6.50"









 Weight  161.00 lb  

 

 Heart Rate  88 /min  

 

 BP Systolic Sitting  112 mmHg  

 

 BP Diastolic Sitting  80 mmHg  

 

 BMI (Body Mass Index)  25.6 kg/m2  









 2009  Height  66.50 inches  5'6.50"









 Weight  167.00 lb  

 

 Heart Rate  76 /min  

 

 BP Systolic Sitting  122 mmHg  

 

 BP Diastolic Sitting  86 mmHg  

 

 BMI (Body Mass Index)  26.5 kg/m2  









 2009  Weight  161.00 lb  









 Heart Rate  90 /min  

 

 BP Systolic Sitting  112 mmHg  

 

 BP Diastolic Sitting  62 mmHg  









 2009  Height  67 inches  5'7"









 Weight  156.00 lb  

 

 Heart Rate  64 /min  

 

 BP Systolic Sitting  118 mmHg  

 

 BP Diastolic Sitting  76 mmHg  

 

 BMI (Body Mass Index)  24.4 kg/m2  









 2009  Weight  151.00 lb  









 Heart Rate  70 /min  

 

 BP Systolic Sitting  130 mmHg  

 

 BP Diastolic Sitting  70 mmHg  

 

 Respiratory Rate  16 /min  









 2008  Height  67 inches  5'7"









 Weight  142.00 lb  

 

 Heart Rate  86 /min  

 

 BP Systolic Sitting  124 mmHg  

 

 BP Diastolic Sitting  82 mmHg  

 

 BMI (Body Mass Index)  22.2 kg/m2  









 10/22/2008  Height  67 inches  5'7"









 Weight  140.00 lb  

 

 Heart Rate  60 /min  

 

 BP Systolic Sitting  116 mmHg  

 

 BP Diastolic Sitting  60 mmHg  

 

 BMI (Body Mass Index)  21.9 kg/m2  









 2008  Height  67 inches  5'7"









 Weight  154.00 lb  

 

 Heart Rate  68 /min  

 

 BP Systolic Sitting  124 mmHg  

 

 BP Diastolic Sitting  82 mmHg  

 

 BMI (Body Mass Index)  24.1 kg/m2  









 07/15/2008  Height  67 inches  5'7"









 Weight  155.00 lb  

 

 Heart Rate  76 /min  

 

 BP Systolic Sitting  114 mmHg  

 

 BP Diastolic Sitting  76 mmHg  

 

 BMI (Body Mass Index)  24.3 kg/m2  









 2008  Height  67 inches  5'7"









 Weight  157.00 lb  

 

 Heart Rate  62 /min  

 

 BP Systolic Sitting  116 mmHg  

 

 BP Diastolic Sitting  60 mmHg  

 

 BMI (Body Mass Index)  24.6 kg/m2  









 2008  Height  67 inches  5'7"









 Weight  163.00 lb  

 

 Heart Rate  76 /min  

 

 BP Systolic Sitting  112 mmHg  

 

 BP Diastolic Sitting  62 mmHg  

 

 BMI (Body Mass Index)  25.5 kg/m2  









 2007  Height  67 inches  5'7"









 Weight  167.00 lb  

 

 Heart Rate  80 /min  

 

 BP Systolic Sitting  112 mmHg  

 

 BP Diastolic Sitting  80 mmHg  

 

 Body Temperature  98.3 F  

 

 BMI (Body Mass Index)  26.2 kg/m2  









 2007  Height  67 inches  5'7"









 Weight  150.00 lb  

 

 Heart Rate  88 /min  

 

 BP Systolic Sitting  115 mmHg  

 

 BP Diastolic Sitting  70 mmHg  

 

 BMI (Body Mass Index)  23.5 kg/m2  







Results







 Test  Date  Test  Result  H/L  Range  Note

 

 Laboratory test finding  2016  Monospot  Negative    Negative  1

 

 CBC Auto Diff  2016  White Blood Count  4.8 10^3/uL    3.5-10.8  









 Red Blood Count  4.77 10^6/uL    4.0-5.4  

 

 Hemoglobin  13.3 g/dL    12.0-16.0  

 

 Hematocrit  40 %    35-47  

 

 Mean Corpuscular Volume  83 fL    80-97  

 

 Mean Corpuscular Hemoglobin  28 pg    27-31  

 

 Mean Corpuscular HGB Conc  34 g/dL    31-36  

 

 Red Cell Distribution Width  13 %    10.5-15  

 

 Platelet Count  292 10^3/uL    150-450  

 

 Mean Platelet Volume  9 um3    7.4-10.4  

 

 Abs Neutrophils  2.5 10^3/uL    1.5-7.7  

 

 Abs Lymphocytes  1.8 10^3/uL    1.0-4.8  

 

 Abs Monocytes  0.4 10^3/uL    0-0.8  

 

 Abs Eosinophils  0 10^3/uL    0-0.6  

 

 Abs Basophils  0 10^3/uL    0-0.2  

 

 Abs Nucleated RBC  0 10^3/uL      

 

 Granulocyte %  53.3 %    38-83  

 

 Lymphocyte %  36.8 %    25-47  

 

 Monocyte %  8.6 %    1-9  

 

 Eosinophil %  0.4 %    0-6  

 

 Basophil %  0.9 %    0-2  

 

 Nucleated Red Blood Cells %  0.1      









 Laboratory test finding  2016  Erythrocyte Sed Rate  14 mm/Hr    0-14  2









 CRP High Sensitivity  1.99 mg/L      3









 Comp Metabolic Panel  2016  Sodium  135 mmol/L    133-145  









 Potassium  4.2 mmol/L    3.5-5.0  

 

 Chloride  100 mmol/L  Low  101-111  

 

 Co2 Carbon Dioxide  28 mmol/L    22-32  

 

 Anion Gap  7 mmol/L    2-11  

 

 Glucose  91 mg/dL      

 

 Blood Urea Nitrogen  8 mg/dL    6-24  

 

 Creatinine  0.79 mg/dL    0.51-0.95  

 

 BUN/Creatinine Ratio  10.1    8-20  

 

 Calcium  9.3 mg/dL    8.6-10.3  

 

 Total Protein  7.3 g/dL    6.4-8.9  

 

 Albumin  4.5 g/dL    3.2-5.2  

 

 Globulin  2.8 g/dL    2-4  

 

 Albumin/Globulin Ratio  1.6    1-3  

 

 Total Bilirubin  0.40 mg/dL    0.2-1.0  

 

 Alkaline Phosphatase  53 U/L      

 

 Alt  12 U/L    7-52  

 

 Ast  16 U/L    13-39  

 

 Egfr Non-  78.3    &gt;60  

 

 Egfr   100.8    &gt;60  4









 Laboratory test finding  2016  TSH (Thyroid Stim Horm)  2.42 ?IU/mL    
0.34-5.60  









 Lyme Disease Serology  Negative    Negative  5









 Urinalysis Profile  2016  Urine White Blood Cell  Trace(0-5/hpf)    
Absent  









 Urine Red Blood Cell  Trace(0-2/hpf)    Absent  

 

 Urine Bacteria  Absent    Absent  

 

 Urine Squamous Epithelial Cell  Present    Absent  

 

 Urine Color  Yellow      

 

 Urine Appearance  Clear      

 

 Urine Specific Gravity  1.006  Low  1.010-1.030  

 

 Urine pH  7.0    5-9  

 

 Urine Urobilinogen  Negative    Negative  

 

 Urine Ketones  Negative    Negative  

 

 Urine Protein  Negative    Negative  

 

 Urine Leukocytes  Negative    Negative  

 

 Urine Blood  2+    Negative  

 

 Urine Nitrite  Negative    Negative  

 

 Urine Bilirubin  Negative    Negative  

 

 Urine Glucose  Negative    Negative  









 Vitamin D, 25 Hydroxy  10/17/2013  25-Hydroxy Vitamin D2  8.8 ng/mL      









 25-Hydroxy Vitamin D3  21 ng/mL      

 

 25-Hydroxy Vitamin D Total  30 ng/mL      6









 Vitamin B12 And Folate Serum  10/17/2013  Vitamin B12  527 pg/mL    180-914  









 Folate  9.1 ng/mL    2-16  









 CBC With Manual Diff  10/17/2013  White Blood Count  7.2 10^3/uL    4.8-10.8  









 Red Blood Count  4.60 10^6/uL    4.0-5.4  

 

 Hemoglobin  13.5 g/dL    12.0-16.0  

 

 Hematocrit  39 %    35-47  

 

 Mean Corpuscular Volume  85 fL    80-97  

 

 Mean Corpuscular Hemoglobin  29 pg    27-31  

 

 Mean Corpuscular HGB Conc  34 g/dL    31-36  

 

 Red Cell Distribution Width  14 %    10.5-15  

 

 Platelet Count  294 10^3/uL    150-450  

 

 Mean Platelet Volume  9 um3    7.4-10.4  

 

 Abs Neutrophils  4.7 10^3/uL    1.5-7.7  

 

 Abs Lymphocytes  2.0 10^3/uL    1.0-4.8  

 

 Abs Monocytes  0.5 10^3/uL    0-0.8  

 

 Abs Eosinophils  0 10^3/uL    0-0.6  

 

 Abs Basophils  0 10^3/uL    0-0.2  

 

 Abs Nucleated RBC  0 10^3/uL      

 

 Neutrophil %  56 %    38-83  

 

 Band %  2 %    0-8  

 

 Lymphocytes %  30 %    25-47  

 

 Monocytes %  9 %    0-13  

 

 Eosinophils %  3 %    0-6  

 

 RBC Morphology  Normal    Normal  









 Basic Metabolic Panel  10/17/2013  Sodium  134 mmol/L    133-145  









 Potassium  4.5 mmol/L    3.5-5.0  

 

 Chloride  103 mmol/L    101-111  

 

 Co2 Carbon Dioxide  26.0 mmol/L    22-32  

 

 Anion Gap  5.0 mmol/L    2-11  

 

 Glucose  108 mg/dL  High    

 

 Blood Urea Nitrogen  11 mg/dL    6-24  

 

 Creatinine  0.80 mg/dL    0.50-1.40  

 

 BUN/Creatinine Ratio  13.8    8-20  

 

 Calcium  8.9 mg/dL    8.1-9.9  

 

 Egfr Non-  78.3    &gt;60  

 

 Egfr   100.7    &gt;60  7









 Liver Function Panel  2013  Total Protein  6.4 g/dL    6.2-8.1  









 Albumin  3.8 g/dL    3.6-5.4  

 

 Globulin  2.6 g/dL    2-4  

 

 Albumin/Globulin Ratio  1.5    1-3  

 

 Total Bilirubin  0.5 mg/dL    0.4-1.5  

 

 Direct Bilirubin  0.1 mg/dL    0.1-0.5  

 

 Indirect Bilirubin  0.4 mg/dL    0.3-1.0  

 

 Alkaline Phosphatase  73 U/L      

 

 Alt  18 U/L    14-54  

 

 Ast  19 U/L    12-42  









 Laboratory test finding  2013  Egg White Allergen IgE  &lt;0.35 kU/L    
  8









 Beef Allergen IgE  &lt;0.35 kU/L      9

 

 Chicken Meat Allergen IgE  &lt;0.35 kU/L      10

 

 Chocolate Allergen IgE  &lt;0.35 kU/L      11

 

 Corn Allergen IgE  &lt;0.35 kU/L      12

 

 Cow's Milk Allergen IgE  &lt;0.35 kU/L      13

 

 Orange Allergen IgE  &lt;0.35 kU/L      14

 

 Peanut Allergen IgE  &lt;0.35 kU/L      15

 

 Rice Allergen IgE  &lt;0.35 kU/L      16

 

 Soybean Allergen IgE  &lt;0.35 kU/L      17

 

 Tomato Allergen IgE  &lt;0.35 kU/L      18

 

 Wheat Allergen IgE  &lt;0.35 kU/L      19









 Lipid Profile (Trig/Chol/HDL)  2013  Triglycerides  90 mg/dL      









 Cholesterol  180 mg/dL    Less than 200  

 

 HDL Cholesterol  52 mg/dL    40-60  20

 

 Cholesterol/HDL Ratio  3.5 Average    1-4.44  

 

 LDL Cholesterol  110.0  High  Less Than 100  21









 Laboratory test finding  2013  Glucose  99 mg/dL      22

 

 Laboratory test finding  2013  Free T4  0.76 ng/mL    0.61-1.24  









 T4  5.6 g/mL    5.0-12.0  

 

 Total T3  0.62 ng/mL    0.5-1.7  









 CBC Auto Diff  2013  White Blood Count  5.5 10^3/uL    4.8-10.8  









 Red Blood Count  4.28 10^6/uL    4.0-5.4  

 

 Hemoglobin  12.2 g/dL    12.0-16.0  

 

 Hematocrit  35 %    35-47  

 

 Mean Corpuscular Volume  82 fL    80-97  

 

 Mean Corpuscular Hemoglobin  29 pg    27-31  

 

 Mean Corpuscular HGB Conc  35 g/dL    31-36  

 

 Red Cell Distribution Width  13 %    10.5-15  

 

 Platelet Count  259 10^3/uL    150-450  

 

 Mean Platelet Volume  9 um3    7.4-10.4  

 

 Abs Neutrophils  3.1 10^3/uL    1.5-7.7  

 

 Abs Lymphocytes  1.8 10^3/uL    1.0-4.8  

 

 Abs Monocytes  0.5 10^3/uL    0-0.8  

 

 Abs Eosinophils  0 10^3/uL    0-0.6  

 

 Abs Basophils  0 10^3/uL    0-0.2  

 

 Abs Nucleated RBC  0 10^3/uL      

 

 Granulocyte %  56.7 %    38-83  

 

 Lymphocyte %  32.7 %    25-47  

 

 Monocyte %  9.9 %  High  1-9  

 

 Eosinophil %  0.2 %    0-6  

 

 Basophil %  0.5 %    0-2  

 

 Nucleated Red Blood Cells %  0      









 Comp Metabolic Panel  2013  Sodium  133 mmol/L    133-145  









 Potassium  4.5 mmol/L    3.5-5.0  

 

 Chloride  103 mmol/L    101-111  

 

 Co2 Carbon Dioxide  26.0 mmol/L    22-32  

 

 Anion Gap  4.0 mmol/L    2-11  

 

 Glucose  105 mg/dL  High    

 

 Blood Urea Nitrogen  11 mg/dL    6-24  

 

 Creatinine  0.70 mg/dL    0.50-1.40  

 

 BUN/Creatinine Ratio  15.7    8-20  

 

 Calcium  9.5 mg/dL    8.1-9.9  

 

 Total Protein  6.0 g/dL  Low  6.2-8.1  

 

 Albumin  3.8 g/dL    3.6-5.4  

 

 Globulin  2.2 g/dL    2-4  

 

 Albumin/Globulin Ratio  1.7    1-3  

 

 Total Bilirubin  0.4 mg/dL    0.4-1.5  

 

 Alkaline Phosphatase  66 U/L      

 

 Alt  16 U/L    14-54  

 

 Ast  20 U/L    12-42  

 

 Egfr Non-  91.8    &gt;60  

 

 Egfr   118.0    &gt;60  23









 Laboratory test finding  2013  C Reactive Protein  0.6 mg/dL  High  Less 
than 0.5  









 Erythrocyte Sed Rate  17 mm/Hr  High  0-14  









 Vitamin D, 25 Hydroxy  2013  25-Hydroxy Vitamin D2  &lt;4.0 ng/mL      









 25-Hydroxy Vitamin D3  28 ng/mL      

 

 25-Hydroxy Vitamin D Total  28 ng/mL      24









 Laboratory test finding  2013  TSH (Thyroid  1.61 miu/mL    0.34-5.60  



     Stimulating Horm)        

 

 Vitamin D, 25 Hydroxy  2012  25-Hydroxy Vitamin D2  &lt;4.0 ng/mL    ()  









 25-Hydroxy Vitamin D3  18 ng/mL    ()  

 

 25-Hydroxy Vitamin D Total  18 ng/mL    ()  25









 Lead  2012  Lead  &lt; 1.0 g/dL    0-25.0  26









 Lead Specimen Type  VENOUS      









 Laboratory test finding  2012  [place],Whole Blood  &lt;1 ng/mL    0-9  
27

 

 Laboratory test finding  2011  Erythrocyte Sed Rate  8 MM/HR    0-15  









 C Reactive Protein  &lt; 0.5 mg/dL    Less Than 0.5  

 

 Rheumatoid Factor  &lt; 15 IU/mL    &lt;15  28

 

 Lyme Disease Serology  Negative    Negative  29









 Trish (Antinuclear Antibodies)  2011  Antinuclear AB  NEGATIVE    Negative
  

 

 CBC Auto Diff  2011  White Blood Count  6.5 CUMM    4.8-10.8  









 Red Cell Count  4.24 CUMM    4.2-5.4  

 

 Hemoglobin  12.0 g/dL    12.0-16.0  

 

 Hematocrit  35 %    35-47  

 

 Mean Corpuscular Volume  84 um3    79-97  

 

 Mean Corpuscular Hemoglob  28 pg    27-31  

 

 Mean Corpuscular HGB Cone  34 g/dL    32-36  

 

 Redcell Distribution WDTH  13 %    10.5-15  

 

 Platelet Count  290 CUMM    150-450  

 

 Mean Platelet Volume  8.4 um3    7.4-10.4  

 

 Gran %  61.8 %    38-83  

 

 Lymph %  29.2 %    25-47  

 

 Mononuclear %  8.4 %    1-9  

 

 Eosinophil %  0 %    0-6  

 

 Basophil %  0.6 %    0-2  

 

 Abs Lymphs  1.9    1.0-4.8  

 

 Abs Mononuclear  0.5    0-0.8  

 

 Absolute Neutrophil Count  4.0    1.5-7.7  

 

 Abs Eosinophils  0    0-0.6  

 

 Abs Basophils  0    0-0.2  









 Lipid Profile (Trig/Chol/HDL)  2011  Triglyceride  106 mg/dL      









 Cholesterol  202 mg/dL  High  Less Than 200  30

 

 High Density Lipoprotein  55 mg/dL    40-60  31

 

 Cholesterol/HDL Ratio  3.67 AVERAGE    1-4.44  

 

 Low Density Lipoprotein  126 mg/dL  High  Less Than 100  32









 Comp Metabolic Panel  2011  Sodium  138 mmol/L    135-145  









 Potassium  4.0 mmol/L    3.5-5.0  

 

 Chloride  105 mmol/L    101-111  

 

 Co2 (Carbon Dioxide)  26.0 mmol/L    22-32  

 

 Anion Gap  7.0 mmol/L    2-11  33

 

 Glucose  73 mg/dL      

 

 BUN  10 mg/dL    6-24  

 

 Creatinine  0.7 mg/dL    0.50-1.40  

 

 One Over Creatinine  1.42      

 

 BUN/Creatinine Ratio  14.3    8-20  

 

 Calcium  9.4 mg/dL    8.1-9.9  

 

 Total Protein  6.4 GM/DL    6.2-8.1  

 

 Albumin  4.2 GM/DL    3.6-5.4  

 

 Globulin  2.2 GM/DL    2-4  

 

 Albumin/Globulin Ratio  1.9    1-3  

 

 Bilirubin Total  0.4 mg/dL    0.4-1.5  34

 

 Alkaline Phosphatase  79 U/L      

 

 Alt (SGPT)  17 U/L    14-54  

 

 Ast (Sgot)  23 U/L    12-42  

 

 eGFR Non-  92.2    &gt; 60  

 

 eGFR   118.6    &gt; 60  35









 DR Olson's Lab Panel  2011  TSH  1.64 MIU/ML    0.34-5.60  

 

 Vad  10/11/2011  Vad Final  NONREACTIVE    Nonreactive  36

 

 Vitamin D, 25 Hydroxy  2010  25-Hydroxy Vitamin D2  &lt;4.0 ng/mL    ()  









 25-Hydroxy Vitamin D3  26 ng/mL    ()  

 

 25-Hydroxy Vitamin D Total  26 ng/mL    ()  37









 Laboratory test finding  2010  Vitamin D, 1,25 Dihydroxy  29 pg/mL    18-
78  38

 

 Vitamin D, 25 Hydroxy  2010  25-Hydroxy Vitamin D2  &lt;4.0 ng/mL    ()  









 25-Hydroxy Vitamin D3  27 ng/mL    ()  

 

 25-Hydroxy Vitamin D Total  27 ng/mL    ()  39









 Laboratory test finding  2010  Cortisol  14.3 g/dL      40









 Dhea Sulfate  62.1 g/dL      41

 

 Dhea  2.6 ng/mL    &lt;10  42

 

 Vitamin B12  703 pg/mL    180-914  

 

 Glucose  105 mg/dL  High    43









 Laboratory test finding  2009  Erythrocyte Sed Rate  10 MM/HR    0-15  









 Lyme Disease Serology  Negative    Negative  44

 

 Rheumatoid Factor  &lt; 20.0 IU/mL    Less Than 20  

 

 Anti-Nuclear Antibody  &lt;0.1 U    &lt;=1.0  45









 Laboratory test finding  2009  CRP High Sensitivity MML  1.4 mg/L    &lt;
=3.0  46

 

 Laboratory test finding  2009  [facility] [place] Panel  (SEE NOTE)      47









 [facility] Peanut  (SEE NOTE)      48

 

 [facility] Walnuts  (SEE NOTE)      49









 Laboratory test finding  2009  [facility] [place] Panel  (SEE NOTE)      50









 [facility] Peanut  (SEE NOTE)      51

 

 [facility] Walnuts  (SEE NOTE)      52

 

 [employer] [employer]  (SEE NOTE)      53









 CBC With Manual Diff  2008  White Blood Count  8.1 CUMM    4.8-10.8  









 Red Cell Count  4.45 CUMM    4.2-5.4  

 

 Hemoglobin  13.1 g/dL    12.0-16.0  

 

 Hematocrit  37 %    35-47  

 

 Mean Corpuscular Volume  84 um3    79-97  

 

 Mean Corpuscular Hemoglob  29 pg    27-31  

 

 Mean Corpuscular HGB Cone  35 g/dL    32-36  

 

 Redcell Distribution WDTH  13 %    10.5-15  

 

 Platelet Count  248 CUMM    150-450  

 

 Mean Platelet Volume  8.4 um3    7.4-10.4  

 

 Polysegmented Neutrophil  82 %    38-83  

 

 Band Neutrophil  3 %    0-8  

 

 Lymphocyte  9 %  Low  25-47  

 

 Monocyte  4 %    0-13  

 

 Eosenophil  1 %    0-6  

 

 Atypical Lymph  1 %    0-6  

 

 Absolute Neutrophil Count  6.8      

 

 Anisocytosis  SLIGHT      









 Protime  2008  Protime  12.3    10.9-13.3  









 Inr  1.03      54









 Laboratory test finding  2008  Rheumatoid Factor  &lt; 20.0 IU/mL    
Less Than 20  









 Lyme Disease Serology  Negative    Negative  55

 

 Erythrocyte Sed Rate  11 MM/HR    0-15  

 

 C Reactive Protein  0.6 mg/dL  High  Less Than 0.5  

 

 Anti Dna (Double Stranded Dna)  NEGATIVE    Negative  









 FSH And LH  2008  FSH  1.98 MIU/ML      56









 Lutenizing Hormone  1.34 MIU/ML      57









 Laboratory test finding  2008  Estradiol  98 pg/mL      58

 

 Basic Metabolic Panel  2008  Sodium  136 mmol/L    135-145  









 Potassium  4.3 mmol/L    3.5-5.0  

 

 Chloride  104 mmol/L    101-111  

 

 Co2 (Carbon Dioxide)  27.0 mmol/L    22-32  

 

 Anion Gap  5.0 mmol/L    2-11  59

 

 Glucose  100 mg/dL      60

 

 BUN  15 mg/dL    6-24  

 

 Creatinine  0.66 mg/dL    0.50-1.40  

 

 One Over Creatinine  1.50      

 

 BUN/Creatinine Ratio  22.7  High  8-20  

 

 Calcium  9.0 mg/dL    8.1-9.9  61









 Lipid Profile (Trig/Chol/HDL)  2008  Triglyceride  99 mg/dL      62









 Cholesterol  165 mg/dL    Less Than 200  62, 63

 

 High Density Lipoprotein  44 mg/dL    40-60  62, 64

 

 Cholesterol/HDL Ratio  3.75 AVERAGE    1-4.44  62

 

 Low Density Lipoprotein  101 mg/dL  High  Less Than 100  62, 65









 1  Would you like an EBV if Monospot is Negative?: N

 

 2  Would you like an EBV if Monospot is Negative?: N

 

 3  Low risk: &lt;1.00



   Average risk: 1.00-3.00



   High risk: &gt;3.00

 

 4  *******Because ethnic data is not always readily available,



   this report includes an eGFR for both -Americans and



   non- Americans.****



   The National Kidney Disease Education Program (NKDEP) does



   not endorse the use of the MDRD equation for patients that



   are not between the ages of 18 and 70, are pregnant, have



   extremes of body size, muscle mass, or nutritional status,



   or are non- or non-.



   According to the National Kid[employer] Foundation, irrespective of



   diagnosis, the stage of the disease is based on the level of



   kidney function:



   Stage Description                      GFR(mL/min/1.73 m(2))



   1     Kidney damage with normal or decreased GFR       90



   2     Kidney damage with mild decrease in GFR          60-89



   3     Moderate decrease in GFR                         30-59



   4     Severe decrease in GFR                           15-29



   5     Kidney failure                       &lt;15 (or dialysis)

 

 5  Serologic response to B. burgdorferi infection is not



   detected, but cannot rule out early infection during



   which low or undetectable antibody levels to



   B. burgdorferi may be present. If clinically indicated,



   a new serum specimen should be submitted in 7-14 days.



   Test Performed by:



   [address] [id number]



   : Celso Mix II, M.D., Ph.D.

 

 6  -- REFERENCE VALUE --



   25-HYDROXY D TOTAL (D2+D3) Optimum levels in the healthy



   population are 20-50, patients with bone disease may



   benefit from higher levels within this range.



   Test Performed by:



   [place] [address]



   : Dilan Barrios III, M.D.

 

 7  *******Because ethnic data is not always readily available,



   this report includes an eGFR for both -Americans and



   non- Americans.****



   The National Kidney Disease Education Program (NKDEP) does



   not endorse the use of the MDRD equation for patients that



   are not between the ages of 18 and 70, are pregnant, have



   extremes of body size, muscle mass, or nutritional status,



   or are non- or non-.



   According to the [employer], irrespective of



   diagnosis, the stage of the disease is based on the level of



   kidney function:



   Stage Description                      GFR(mL/min/1.73 m(2))



   1     Kidney damage with normal or decreased GFR       90



   2     Kidney damage with mild decrease in GFR          60-89



   3     Moderate decrease in GFR                         30-59



   4     Severe decrease in GFR                           15-29



   5     Kidney failure                       &lt;15 (or dialysis)

 

 8  Class 0 (Negative &lt;0.35)



   Test Performed by:



   [place] [id number]



   : Dilan Barrios III, M.D.

 

 9  Class 0 (Negative &lt;0.35)



   Test Performed by:



   [place] [address]



   : Dilan Barrios III, M.D.

 

 10  Class 0 (Negative &lt;0.35)



   Test Performed by:



   [address] [id number]



   : Dilan Barrios III, M.D.

 

 11  Class 0 (Negative &lt;0.35)



   Test Performed by:



   [place] [address]



   : Dilan Barrios III, M.D.

 

 12  Class 0 (Negative &lt;0.35)



   Test Performed by:



   [place] [address]



   : Dilan Barrios III, M.D.

 

 13  Class 0 (Negative &lt;0.35)



   Test Performed by:



   [place] [address]



   : Dilan Barrios III, M.D.

 

 14  Class 0 (Negative &lt;0.35)



   Test Performed by:



   [place] [address]



   : Dilan Barrios III, M.D.

 

 15  Class 0 (Negative &lt;0.35)



   Test Performed by:



   [place] [address]



   : Dilan Barrios III, M.D.

 

 16  Class 0 (Negative &lt;0.35)



   Test Performed by:



   [place] [address]



   : Dilan Barrios III, M.D.

 

 17  Class 0 (Negative &lt;0.35)



   Test Performed by:



   [place] [address]



   : Dilan Barrios III, M.D.

 

 18  Class 0 (Negative &lt;0.35)



   Test Performed by:



   [place] [address]



   : Dilan Barrios III, M.D.

 

 19  Class 0 (Negative &lt;0.35)



   Test Performed by:



   [place] [address]



   : Dilan Barrios III, M.D.

 

 20  HDL Interpretation:



   Undesirable: High Risk:  Less than 40 mg/dL



   Desirable:  Low Risk:  Greater than 60 mg/dL

 

 21  LDL Interpretation:



   Low Risk Optimal Level:  LDL Less than 100 mg/dL



   Near or Above Optimal:  -129 mg/dL



   Borderline High Risk:  -159 mg/dL



   High Risk:  -189 mg/dL



   Very High Risk:  LDL Greater than 189 mg/dL

 

 22  FASTING

 

 23  *******Because ethnic data is not always readily available,



   this report includes an eGFR for both -Americans and



   non- Americans.****



   The National Kidney Disease Education Program (NKDEP) does



   not endorse the use of the MDRD equation for patients that



   are not between the ages of 18 and 70, are pregnant, have



   extremes of body size, muscle mass, or nutritional status,



   or are non- or non-.



   According to the National Kidney Foundation, irrespective of



   diagnosis, the stage of the disease is based on the level of



   kidney function:



   Stage Description                      GFR(mL/min/1.73 m(2))



   1     Kidney damage with normal or decreased GFR       90



   2     Kidney damage with mild decrease in GFR          60-89



   3     Moderate decrease in GFR                         30-59



   4     Severe decrease in GFR                           15-29



   5     Kidney failure                       &lt;15 (or dialysis)

 

 24  -- REFERENCE VALUE --



   25-HYDROXY D TOTAL (D2+D3)



   Optimum levels in the normal population are 25-80



   Test Performed by:



   [place] [address]



   : Dilan Barrios III, M.D.

 

 25  Interpretation: 10-24 (mild to moderate deficiency)



   



   -- REFERENCE VALUE --



   25-HYDROXY D TOTAL (D2+D3)



   Optimum levels in the normal population are 25-80



   



   Test Performed by:



   [facility]



   [address] [place] [address]



   : Dilan Barrios III, M.D.

 

 26  CDC CLASSIFICATIONS FOR BLOOD LEAD CONCENTRATION SCREENING



   IN CHILDREN:



   CDC CLASS*           BLOOD LEAD CONCENTRATION (MCG/DL)



   I                     LESS THAN OR EQUAL TO 9



   IIA                    10 - 14



   IIB                    15 - 19



   III                    20 - 44



   IV                    45 - 69



   V                     GREATER THAN OR EQUAL TO 70



   *REFER TO CURRENT CDC GUIDELINES FOR COMMENTS AND



   INTERVENTIONS RECOMMENDED FOR EACH CLASS.



   



   *******CERTIFICATE OF BLOOD LEAD TESTING*******



   THIS IS TO CERTIFY THAT THE ABOVE NAMED PATIENT HAS



   BEEN TESTED FOR BLOOD LEAD.  TESTING WAS PERFORMED BY



   [facility] WHICH IS



   LICENSED BY [facility] TO PERFORM BLOOD LEAD



   TESTING.



   



   THIS CERTIFICATE IS PROVIDED AS A SERVICE TO OUR



   CLIENTS AND THEIR PATIENTS WHO MAY BE REQUIRED TO



   PRODUCE DOCUMENTATION OF BLOOD LEAD TESTING.



   .

 

 27  Test Performed by:



   [facility]



   [address] [place] [address]



   : Dilan Barrios III, M.D.

 

 28  Test Performed by:



   [facility]



   [address] [place] [address]



   : Dilan Barrios III, M.D.

 

 29  Serologic response to B. burgdorferi infection is not



   detected, but cannot rule out early infection during



   which low or undetectable antibody levels to



   B. burgdorferi may be present. If clinically indicated,



   a new serum specimen should be submitted in 7-14 days.



   



   Test Performed by:



   [facility]



   [address] [place] [id number]



   : Dilan Barrios III, M.D.

 

 30  CHOLESTEROL INTERPRETATION:



   Desirable:  Less than 200 MG/DL



   Borderline-High Risk:  200-239 MG/DL



   High-Risk:  240 MG/DL and over

 

 31  HDL INTERPRETATION:



   Undesirable: High Risk:  Less than 40 MG/DL



   Desirable:  Low Risk:  Greater than 60 MG/DL

 

 32  LDL INTERPRETATION:



   Low Risk Optimal Level:  LDL Less than 100 MG/DL



   Near or Above Optimal:  -129 MG/DL



   Borderline High Risk:  -159 MG/DL



   High Risk:  -189 MG/DL



   Very High Risk:  LDL Greater than 189 MG/DL

 

 33  Anion gap measurement may be of limited value in the



   presence of any alkalosis, especially in a combined acid



   base disorder.



   .

 

 34  A metabolite of Naproxen, O-desmethylnaproxen, has been



   shown to interfere with the [place] method for



   measuring total bilirubin.  Samples from patients who have



   taken Naproxen have shown spurious elevation in total



   bilirubin levels.

 

 35  *******Because ethnic data is not always readily available,



   this report includes an eGFR for both -Americans and



   non- Americans.****



   The National Kidney Disease Education Program (NKDEP) does



   not endorse the use of the MDRD equation for patients that



   are not between the ages of 18 and 70, are pregnant, have



   extremes of body size, muscle mass, or nutritional status,



   or are non- or non-.



   According to the National Kidney Foundation, irrespective of



   diagnosis, the stage of the disease is based on the level of



   kidney function:



   Stage Description                      GFR(mL/min/1.73 m(2))



   1     Kidney damage with normal or decreased GFR       90



   2     Kidney damage with mild decrease in GFR          60-89



   3     Moderate decrease in GFR                         30-59



   4     Severe decrease in GFR                           15-29



   5     Kidney failure                       &lt;15 (or dialysis)

 

 36  FINAL INTERPRETATION:



   **  No HIV antibody is detected.  **



   .



   



   This information has been disclosed to you from confidential



   records which are protected by [place] law.  State



   law prohibits you from making further disclosure of this



   information without the specific written consent of the



   person to whom it pertains, or as otherwise permitted by



   law.  Any unauthorized further disclosure in violation of



   state law may result in a fine or detention sentence or both.



   General authorization for the release of medical or other



   information is not, except in limited circumstances set



   forth in Part 63, Title 10, of [facility], sufficient



   authorization for further disclosure.  Disclosure of



   confidential HIV information that occurs as the result of a



   general authorization for the release of medical or other



   information will be in violation of the state law and may



   result in a fine or a detention sentence.



   .

 

 37  -- REFERENCE VALUE --



   25-HYDROXY D TOTAL (D2+D3)



   Optimum levels in the normal



   population are 25-80



   Test Performed by:



   [facility]



   [address] [place] [address]



   : Dilan Barrios III, M.D.

 

 38  Test Performed by:



   [facility]



   [address] [place] [address]



   : Dilan Barrios III, M.D.

 

 39  -- REFERENCE VALUE --



   25-HYDROXY D TOTAL (D2+D3)



   Optimum levels in the normal



   population are 25-80



   Test Performed by:



   [facility]



   [address] [place] [address]



   : Dilan Barrios III, M.D.

 

 40  REFERENCE RANGE: AM  8.7-22.4



   PM  LESS THAN 10



   .

 

 41  Test Performed by:



   [facility]



   [address] [place] [address]



   : Dilan Barrios III, M.D.

 

 42  Test Performed by:



   [facility]



   [address] [place] [address]



   : Dilan Barrios III, M.D.

 

 43  ** Note change in reference range as of 08.  The



   change was based on recommendations from the American



   Diabetes Association.

 

 44  Test Performed by:



   [facility]



   [address] [place] [address]



   : Dilan Barrios III, M.D.

 

 45  Interpretation: Negative (&lt;=1.0)



   



   Test Performed by:



   [facility]



   [address] [place] [address]



   : Dilan Barrios III, M.D.

 

 46  Average risk



   



   Test Performed by:



   [facility]



   [address] [place] [address]



   : Dilan Barrios III, M.D.

 

 47  TEST RESULT RETURNED FROM [facility]



   SENT TO PHYSICIAN(S) OFFICE.

 

 48  TEST RESULT RETURNED FROM [facility] LAB[facility]ATORY AND HARDCOPY



   SENT TO PHYSICIAN(S) OFFICE.

 

 49  TEST RESULT RETURNED FROM [facility] AND HARDCOP[facility]



   SENT TO PHYSICIAN(S) OFFICE.

 

 50  TEST RESULT RETURNED FROM [facility] LABORATORY AND HARDCOPY



   SENT TO PHYSICIAN(S) OFFICE.

 

 51  TEST RESULT RETURNED FROM [facility] LABORATORY AND HARDCOPY



   SENT TO PHYSICIAN(S) OFFICE.

 

 52  TEST RESULT RETURNED FROM [facility]FER[facility] LABORATORY AND HARDCOPY



   SENT TO PHYSICIAN(S) OFFICE.

 

 53  TEST RESULT RETURNED FROM [facility]FERENCE LABORATORY AND HARDCOPY



   SENT TO PHYSICIAN(S) OFFICE.

 

 54  ELYSE VALUE=2.01 (AS OF 07



   



   Recommended INR for Patients



   on Oral Anticoagulants



   



   



   Prophylaxis                       2.0 - 3.0



   Treatment of thrombosis           2.0 - 3.0



   Prevention of embolism            2.0 - 3.0



   Prevention of embolism from



   prosthetic heart valves        2.5 - 3.5

 

 55  Test Performed by:



   [facility]



   [address] [place] [address]



   : Dilan Barrios III, M.D.

 

 56  NORMAL RANGE



   -------------------------------------------------



   MALES                              1 - 20



   



   NORMALLY MENSTRUATING FEMALES



   - Follicular Phase                 3 - 9



   - Mid-Cycle Peak                   4 - 23



   - Luteal Phase                     1 - 6



   



   POSTMENOPAUSAL FEMALES            16 - 114



   ----------------------------------------------------------



   



   .

 

 57  NORMAL RANGE



   --------------------------------------------------



   MALES                                 2 - 12



   NORMALLY MENSTRUATING FEMALES



   - Follicular Phase                    1 - 18



   - Mid-Cycle Peak                     24 - 105



   - Luteal Phase                      0.6 - 20



   



   POSTMENOPAUSAL FEMALES               15 - 62



   -----------------------------------------------------------



   .

 

 58  EXPECTED RESULTS



   (pg/ml)



   



   MALES                                  20-75



   POSTMENOPAUSAL FEMALES                 20-88



   ____________________________________________________________



   



   NON PREGNANT FEMALES



   



   Mid-Follicular Phase                   



   Periovulatory                          



   Mid Luteal Phase                       

 

 59  Anion gap measurement may be of limited value in the



   presence of any alkalosis, especially in a combined acid



   base disorder.



   .

 

 60  ** Note change in reference range as of 08.  The



   change was based on recommendations from the American



   Diabetes Association.

 

 61  Please note change in reference range effective 08



   



   



   .

 

 62  FASTING

 

 63  CHOLESTEROL INTERPRETATION:



   Desirable:  Less than 200 MG/DL



   Borderline-High Risk:  200-239 MG/DL



   High-Risk:  240 MG/DL and over

 

 64  HDL INTERPRETATION:



   Undesirable: High Risk:  Less than 40 MG/DL



   Desirable:  Low Risk:  Greater than 60 MG/DL

 

 65  LDL INTERPRETATION:



   Low Risk Optimal Level:  LDL Less than 100 MG/DL



   Near or Above Optimal:  -129 MG/DL



   Borderline High Risk:  -159 MG/DL



   High Risk:  -189 MG/DL



   Very High Risk:  LDL Greater than 189 MG/DL







Procedures







 Date  CPT Code  Description  Status

 

 2014  13077  Treadmill Interp/Report Only  Completed

 

 2014  37788  Stress Test Supervsn W/Out I/R  Completed

 

 2014  92503  EKG Tracing &amp; Interpretation  Completed

 

 2014  23927  EKG Tracing &amp; Interpretation  Completed

 

 2014  46279  EKG Tracing &amp; Interpretation  Completed

 

 2014  99690  EEG Recording Awake &amp; Drowsy  Completed

 

 2011  69494  EKG Tracing &amp; Interpretation  Completed

 

 2011  44996  EKG Tracing &amp; Interpretation  Completed

 

 2010  44900  EKG Tracing &amp; Interpretation  Completed

 

 2010    Mammogram  Completed







Encounters







 Type  Date  Location  Provider  CPT E/M  Dx

 

 Office Visit  2018 11:40a  [facility]  Gus Winchester NP  
31036  A69.20



     [place] [facility]      









 R51









 Office Visit  2016  4:00p  [facility]  Riaz Olson,  
31490  R53.82



     - Francie RAI    

 

 Office Visit  2014  4:20p  [facility]  Yenni Avila M.D.  
76634  401.1



     - [place] [facility]      

 

 Office Visit  2014  2:00p  [facility]  Alice Sharp M.D.  
50468  786.50



     [facility]      









 401.1









 Office Visit  2014  3:40p  [facility]  Yenni Avila M.D.  
23487  401.9



     - [facility]      









 786.50

 

 309.81









 Office Visit  01/10/2014  1:00p  [facility]  Gladys Contreras M.D.  05415
  780.02



     [facility]      

 

 Office Visit  2013  9:00a  [facility]  Gladys Contreras M.D.  78816
  784.59



     [facility]      









 781.0

 

 346.70

 

 300.00









 Office Visit  10/17/2013  1:00p  [facility] -  Beena Crawley, N.P.
  [address]952  311



     [facility]      









 723.1









 Office Visit  2013  3:00p  [facility]  Beena Crawley, N.P.  
21311  780.39



     - [facility]      









 311

 

 309.81

 

 729.1

 

 268.9

 

 272.0

 

 346.00

 

 V76.12

 

 V70.9

 

 238.2

 

 727.1

 

 524.60









 Office Visit  2013  2:00p  [facility]  Beena Crawley, N.P.  
63160  780.39



     - [facility]      









 311

 

 309.81

 

 729.1

 

 V77.1

 

 V77.91









 Office Visit  2013  3:30p  [facility]  Beena Crawley, N.P.  
02529  309.81



     - [facility]      









 311

 

 729.1









 Office Visit  2013 11:20a  [facility]  ROYER Arango.OSergey  
45384  729.1









 786.50









 Office Visit  02/15/2013  3:30p  [facility]  Beena Crawley, N.P.  
76833  724.2



     - [facility]      









 729.1

 

 524.60

 

 268.9

 

 V76.10

 

 V77.91

 

 V77.1









 Office Visit  2013  4:00p  [facility]  Riaz Olson,  
49161  729.1



     - Francie RIA    









 783.1

 

 268.9









 Office Visit  2013  3:40p  [facility]  Riaz Olson,  
18167  729.1



     - Francie RAI    









 796.2

 

 268.9

 

 272.0

 

 783.1









 Office Visit  2012  2:40p  [facility]  Tawnya Will,  
93711  786.50



     [facility]  D.O.    









 272.4

 

 796.2

 

 729.1









 Office Visit  2012  9:40a  [facility]  Riaz Olson,  
03344  893.0



     - Francie RAI    

 

 Office Visit  10/11/2011  2:20p  DO Not Use [facility] At  [facility],  [address]900  
382.9



     Tiana ORDAZ.HARRISON    









 E988.1

 

 783.1









 Office Visit  2011  3:40p  DO Not Use [facility]  Riaz Olson,  46515  
729.1



     Tiana ORDAZ.HARRISON    









 780.79









 Office Visit  2011  9:40a  DO Not Use [facility]  Riaz Olson,  87383  
462



     Tiana RAI    

 

 Office Visit  2011  9:40a  [facility]  Tawnya Will,  19552  
786.50



       D.O.    









 785.1









 Office Visit  2011  3:40p  DO Not Use [facility]  Riaz Olson,  26855  
786.50



     [facility]iana RAI    

 

 Office Visit  07/15/2010  2:40p  DO Not Use [facility]  Riaz Olson,  38458  
729.1



     [facility]  M.D.    

 

 Office Visit  2010 12:00p  DO Not Use [facility]  Jose Manuel D. [place],  [address]  
786.51



     [facility]  M.D.,FACP    









 530.81

 

 729.1









 Office Visit  2010  1:40p  DO Not Use [facility]  Jose Manuel D. [place],  7[address]47  
268.9



     [facility]  M.D.,FACP    









 729.1









 Office Visit  2010  8:45a  DO Not Use [facility]  Felix Ramires M.D.  
73321  524.60



     [facility]      









 729.1









 Office Visit  2010  3:00p  DO Not Use [facility]  [place]. [place],  [address]  
729.1



     [place] [facility],F[facility]    









 255.5









 Office Visit  2010 11:30a  DO [place] Use [place]  [address]  
729.1



     [facility]      









 309.0









 Office Visit  2010  9:00a  DO Not Use [place]  [address]  
729.1



     [facility]      

 

 Office Visit  2009  3:00p  DO Not Use [facility]  [address]  
729.1



     [facility]      

 

 Office Visit  2009  2:00p  DO Not Use [facility] [place]  [address]  
729.1



     [facility]      

 

 Office Visit  2009 11:00a  DO Not Use [facility]  [address]  
787.02



     [facility]      









 338.4

 

 292.0









 Office Visit  2009  9:30a  [place] Use [facility] [place]  [address]  
780.79



     [facility]      

 

 Office Visit  2009  8:30a  DO Not Use [facility] At  [place]  [address]  
719.47



     [facility]      

 

 Office Visit  2009 11:30a  [place] Use [facility] [place]  [address]  
729.1



     [facility]      

 

 Office Visit  2009  3:00p  DO Not Use [facility] [place]  [address]  
729.1



     [facility]      

 

 Office Visit  2008  2:30p  [facility] [place] [facility]  [place]  [address]  
338.4



     [facility]      

 

 Office Visit  10/22/2008  3:30p  [facility] [place] Use [facility]  [place]  [address]  
729.1



     [facility]      









 309.0









 Office Visit  10/20/2008  9:00a  [facility] Not Use [facility]  [place]  [address]  
729.1



     [facility]      

 

 Office Visit  2008  2:00p  DO [place]  [address]  
782.7



     [facility]      









 729.1

 

 625.4









 Office Visit  07/15/2008  9:00a  [facility] [place] Use [facility] [place]  [address]  
729.1



     [facility]      

 

 Office Visit  2008  9:00a  DO [place] Use [place]  [address]  
729.1



     [facility]      

 

 Office Visit  2008  9:30a  DO Not Use [facility] At  [facility]  [address]  
461.9



     [facility]      









 729.1









 Office Visit  2007  9:20a  DO Not Use [facility] At  [place],  [address]  
461.9



     [facility] RAI,FACP    









 V04.81









 Office Visit  2007  3:20p  DO Not Use [facility]  [place],  [address]  
780.39



     [facility]iana RAI,FACP    









 780.52

 

 346.00







Plan of Care

Future Appointment(s):02/15/2018 10:20 am - Gus Winchester NP at [facility] - Sjlvcwrbg60/31/2018 10:10 am - Kelsi Thomas NP at [facility] 
[facility] - [facility]